# Patient Record
Sex: MALE | Race: WHITE | NOT HISPANIC OR LATINO | Employment: UNEMPLOYED | URBAN - NONMETROPOLITAN AREA
[De-identification: names, ages, dates, MRNs, and addresses within clinical notes are randomized per-mention and may not be internally consistent; named-entity substitution may affect disease eponyms.]

---

## 2020-11-03 ENCOUNTER — OFFICE VISIT (OUTPATIENT)
Dept: PSYCHIATRY | Facility: CLINIC | Age: 30
End: 2020-11-03

## 2020-11-03 VITALS
BODY MASS INDEX: 24.52 KG/M2 | SYSTOLIC BLOOD PRESSURE: 134 MMHG | HEART RATE: 103 BPM | HEIGHT: 64 IN | WEIGHT: 143.6 LBS | TEMPERATURE: 97.1 F | DIASTOLIC BLOOD PRESSURE: 82 MMHG

## 2020-11-03 DIAGNOSIS — F22 DELUSIONAL DISORDER (HCC): ICD-10-CM

## 2020-11-03 DIAGNOSIS — F63.81 INTERMITTENT EXPLOSIVE DISORDER IN ADULT: ICD-10-CM

## 2020-11-03 DIAGNOSIS — F99 INSOMNIA DUE TO OTHER MENTAL DISORDER: ICD-10-CM

## 2020-11-03 DIAGNOSIS — F90.2 ATTENTION DEFICIT HYPERACTIVITY DISORDER (ADHD), COMBINED TYPE: Primary | ICD-10-CM

## 2020-11-03 DIAGNOSIS — F51.05 INSOMNIA DUE TO OTHER MENTAL DISORDER: ICD-10-CM

## 2020-11-03 PROCEDURE — 90792 PSYCH DIAG EVAL W/MED SRVCS: CPT | Performed by: NURSE PRACTITIONER

## 2020-11-03 RX ORDER — LEVOTHYROXINE SODIUM 0.12 MG/1
125 TABLET ORAL DAILY
COMMUNITY
End: 2021-12-23

## 2020-11-03 RX ORDER — DEXTROAMPHETAMINE SACCHARATE, AMPHETAMINE ASPARTATE, DEXTROAMPHETAMINE SULFATE AND AMPHETAMINE SULFATE 3.75; 3.75; 3.75; 3.75 MG/1; MG/1; MG/1; MG/1
15 TABLET ORAL DAILY
Qty: 30 TABLET | Refills: 0 | Status: SHIPPED | OUTPATIENT
Start: 2020-11-03 | End: 2020-12-01

## 2020-11-03 RX ORDER — QUETIAPINE FUMARATE 400 MG/1
400 TABLET, FILM COATED ORAL 2 TIMES DAILY
Qty: 60 TABLET | Refills: 2 | Status: SHIPPED | OUTPATIENT
Start: 2020-11-03 | End: 2020-11-11 | Stop reason: SDUPTHER

## 2020-11-03 RX ORDER — PROPRANOLOL HYDROCHLORIDE 20 MG/1
20 TABLET ORAL 2 TIMES DAILY PRN
Qty: 60 TABLET | Refills: 2 | Status: SHIPPED | OUTPATIENT
Start: 2020-11-03 | End: 2020-11-11 | Stop reason: SDUPTHER

## 2020-11-03 NOTE — PROGRESS NOTES
"Subjective   Arley Sylvester is a 30 y.o. male who is here today for initial appointment to evaluate for medication options.     Chief Complaint:  behaviors    HPI: pt has been treated by someone at [facility].  He is a resident from [facility] which does group housing for individuals.  He has 1 roommate.  Presents with a staff member who is the main historian.  Staff member also place time to patient's mother who is very involved with patient.  Her name is Elizabeth and she is also patient's power of  and she was present for the entire visit.  The face time.  Mom states that patient has been in psychiatric care since he was 8 years old.  That he has been in long-term staff care since age 12.  She states that she is very involved with him and loves him very much that he could not be controlled at home.  He has history of outbursts with physical aggression.  A letter was sent with the patient from a board certified behavior analysis which states that following: Arley sylvester receives behavior supports to address episodes of verbal aggression, physical transportation, property destruction, self-injurious behavior, taking out his belongings, and inappropriate social behavior over the course of the last few months Arley has had several medication changes that.  Directly for stronger than overall increase in the frequency, duration, and magnitude of tardive behavior.  While Arley has been \"healthy at home\" due to the COVID-19, this is not appear to have a negative impact on him, as he enjoys being in his home and having access to all of his belongings.  Each time there has been medication adjustment away from work because he has been prescribed for years, staff describe an increase in target behavior in the weeks and follow-up.  Over the last 2 to 3 weeks, Arley has engaged in high negative behaviors such as tearing down curtains and blinds, flipping his bed, eating/kicking at staff, and spitting on staff.  " "These behaviors are typically very few and far between but have now been occurring at least once a week.  Staff also reported increasing behaviors suggestive, repeating his clothing and displaying verbal aggression towards staff.  Many of the behaviors are seemingly unprovoked (i.e.  Arley was sitting on the couch and all of a sudden reached up and started tearing curtains and blinds down): A result of very minor \"issues\" (staff asking the COVID-19 his tablet away).  Target behaviors occurring in the presence of staff, across all shifts.  Furthermore reports indicate that since the last medication change, Arley has experienced a change in sleeping patterns and is not sleeping well at night.  Historically when this occurs started behaviors are further exacerbated and Arley becomes even more aggressive.  It is a concern of the T that if this pattern of behavior continues to go to potentially hurt himself or others and it is the recommendation of the team that this medication regimen be returned to what he was previously prescribed.  He states he does have trouble both falling and staying asleep.  Currently he is getting approximately 4 hours sleep at night.  Everyone states that he was much better on the Seroquel at 800 mg total along with the Adderall.  States the Adderall seemed to calm him down and decrease his irritability.  Mom and staff member both state that the Strattera has not seemed to help with his aggression.  Mother states that patient's previous medications were Seroquel 400 mg twice a day along with Adderall up to 60 mg a day and states that patient was better on this medication.  They state the Adderall does seem to be down.  See below for other previous medication trials.  They state that patient is living in affectionate at times.  Says that patient is obsessed with \"dark things\" such as cemeteries etc.  They state that patient thinks he can apply.  He talks to himself all the time.  He has " admitted to hearing voices.  Patient currently denies having any visual hallucinations.  He also denied to me that the voices are commanding to him and tell him to hurt himself or others.  He denies any plan to hurt himself.  He has not had a previous suicide attempt in the past.  They also state that at sometimes he will just have a crazy laugh out of nowhere and then starts having destructive behavior.    History of Present Illness    Past Psych History:  Diagnosed with OCD, generalized psychotic disorder, ADHD, moderate mental retardation    Previous Psych Meds: Zyprexa in the past caused a 60 pound weight gain.  Depakote caused neutropenia, Zoloft did not help, clonidine caused some hypotension.    Substance Abuse:  none    Social History: Mom states that he was a full-term baby.  Did have some jaundice but no other complications.  He was developmentally delayed with both talking and sitting up. See above for rest of social        Family Psychiatric History:  family history is not on file.    Medical/Surgical History:  No past medical history on file.  Past Surgical History:   Procedure Laterality Date   • CIRCUMCISION     • TESTICLE UNDESCENDED REPAIR         Allergies   Allergen Reactions   • Dilantin [Phenytoin] Rash           Current Medications:   Current Outpatient Medications   Medication Sig Dispense Refill   • levothyroxine (SYNTHROID, LEVOTHROID) 125 MCG tablet Take 125 mcg by mouth Daily.     • amphetamine-dextroamphetamine (ADDERALL) 15 MG tablet Take 1 tablet by mouth Daily. 30 tablet 0   • propranolol (INDERAL) 20 MG tablet Take 1 tablet by mouth 2 (Two) Times a Day As Needed (anxiety). 60 tablet 2   • QUEtiapine (SEROquel) 400 MG tablet Take 1 tablet by mouth 2 (Two) Times a Day. 60 tablet 2     No current facility-administered medications for this visit.          Review of Systems   Constitutional: Negative for activity change, appetite change and fatigue.   HENT: Negative.    Eyes: Negative for  "visual disturbance.   Respiratory: Negative.    Cardiovascular: Negative.    Gastrointestinal: Negative for nausea.   Endocrine: Negative.    Genitourinary: Negative.    Musculoskeletal: Negative for arthralgias.   Skin: Negative.    Allergic/Immunologic: Negative.    Neurological: Negative for dizziness, seizures and headaches.   Hematological: Negative.    Psychiatric/Behavioral: Positive for agitation, behavioral problems, decreased concentration and sleep disturbance. Negative for confusion, dysphoric mood, hallucinations, self-injury and suicidal ideas. The patient is nervous/anxious and is hyperactive.     denies HEENT, cardiovascular, respiratory, liver, renal, GI/, endocrine, neuro, DERM, hematology, immunology, musculoskeletal disorders.    Objective   Physical Exam  Constitutional:       Appearance: He is normal weight.   HENT:      Head: Normocephalic.   Neck:      Musculoskeletal: Normal range of motion.   Neurological:      Mental Status: He is alert.   Psychiatric:         Attention and Perception: He is inattentive.         Mood and Affect: Affect is blunt.         Behavior: Behavior is slowed. Behavior is cooperative.         Cognition and Memory: Cognition is impaired.         Judgment: Judgment is impulsive.      Comments: Cooperative today.  Poor eye contact.  Speech impediment and hard to understand at times       Blood pressure 134/82, pulse 103, temperature 97.1 °F (36.2 °C), height 162.6 cm (64\"), weight 65.1 kg (143 lb 9.6 oz).    Mental Status Exam:   Hygiene:   fair  Cooperation:  Evasive  Eye Contact:  Poor  Psychomotor Behavior:  Restless  Affect:  Blunted  Hopelessness: Denies  Speech:  hard to understand jarbled  Thought Process:  Unable to demonstrate  Thought Content:  Unable to demonstrate  Suicidal:  None  Homicidal:  None  Hallucinations:  Auditory  Delusion:  None  Memory:  Unable to evaluate  Orientation:  Person  Reliability:  poor  Insight:  Poor  Judgement:  Poor  Impulse " Control:  Poor  Physical/Medical Issues:  Yes seizure disorder, hypothyroidism      Short-term goals: Patient will be compliant with clinic appointments.  Patient will be engaged in therapy, medication compliant with minimal side effects. Patient  will report decrease of symptoms and frequency.    Long-term goals: Patient will have minimal symptoms of  with continued medication management. Patient will be compliant with treatment and appointments.       Problem list:   Strengths:  Weaknesses:     Assessment/Plan   Problems Addressed this Visit     None      Visit Diagnoses     Attention deficit hyperactivity disorder (ADHD), combined type    -  Primary    Relevant Medications    QUEtiapine (SEROquel) 400 MG tablet    amphetamine-dextroamphetamine (ADDERALL) 15 MG tablet    Delusional disorder (CMS/HCC)        Relevant Medications    QUEtiapine (SEROquel) 400 MG tablet    amphetamine-dextroamphetamine (ADDERALL) 15 MG tablet    Intermittent explosive disorder in adult        Relevant Medications    QUEtiapine (SEROquel) 400 MG tablet    propranolol (INDERAL) 20 MG tablet    amphetamine-dextroamphetamine (ADDERALL) 15 MG tablet    Insomnia due to other mental disorder        Relevant Medications    QUEtiapine (SEROquel) 400 MG tablet    amphetamine-dextroamphetamine (ADDERALL) 15 MG tablet      Diagnoses       Codes Comments    Attention deficit hyperactivity disorder (ADHD), combined type    -  Primary ICD-10-CM: F90.2  ICD-9-CM: 314.01     Delusional disorder (CMS/HCC)     ICD-10-CM: F22  ICD-9-CM: 297.1     Intermittent explosive disorder in adult     ICD-10-CM: F63.81  ICD-9-CM: 312.34     Insomnia due to other mental disorder     ICD-10-CM: F51.05, F99  ICD-9-CM: 300.9, 327.02         alissa reviewed.  Shows gabapentin 300mg BID from [facility] Cazares.  Last prescribed 10/27/20.  Also adderall eR last prescribed 8/4/20 by Rohan Araujo.    uds  Obtained today and pending    Very lengthy discussion regarding his  treatment plan.  I am going to go ahead and increase his Seroquel back up to 400 mg twice a day and restart some Adderall but I am only starting it at 15 mg.  I am adding some Inderal twice a day to see if this helps with his overall anxiety as they state when he seems anxious is when he seems to act out more.  Risks, benefits, side effects of the medication were all discussed.  Mom is aware that the Seroquel is in the antipsychotic group and can cause weight gain, increased blood sugar, increased cholesterol as well as permanent movement disorders.  She is also aware that the Adderall is a controlled substance and has abuse potential.  However this medication is at least given in a controlled environment.  She is aware that he will be subjected to urine drug screens as well as Champ reporting.  Aware that this medication can cause increased blood sugar and heart rate.  We will monitor this.  The future possibilities may include retrying a small SSRI.  Intermittent explosive disorder as well as some Remeron for sleep if needed and some hydroxyzine as needed for anxiety we will see.  They state that he does get regular blood work through his primary care physician and they are going to bring in copies of this at his next visit.  I am  also discontinuing the Strattera.    Mother acknowledged and verbally consented to the treatment plan.  .  staff is aware to contact the [facility] with any worsening of symptom.  Staff  is agreeable to go to the ER or call 911 should they begin SI/HI.     Return in 4 weeks.        This document has been electronically signed by TAHIR Galloway on   November 3, 2020 14:11 EST.

## 2020-11-11 ENCOUNTER — TELEPHONE (OUTPATIENT)
Dept: PSYCHIATRY | Facility: CLINIC | Age: 30
End: 2020-11-11

## 2020-11-11 DIAGNOSIS — F63.81 INTERMITTENT EXPLOSIVE DISORDER IN ADULT: ICD-10-CM

## 2020-11-11 RX ORDER — QUETIAPINE FUMARATE 400 MG/1
TABLET, FILM COATED ORAL
Qty: 45 TABLET | Refills: 2 | Status: SHIPPED | OUTPATIENT
Start: 2020-11-11 | End: 2020-12-08 | Stop reason: SDUPTHER

## 2020-11-11 RX ORDER — PROPRANOLOL HYDROCHLORIDE 20 MG/1
20 TABLET ORAL 2 TIMES DAILY
Qty: 60 TABLET | Refills: 2 | Status: SHIPPED | OUTPATIENT
Start: 2020-11-11 | End: 2020-12-08 | Stop reason: SDUPTHER

## 2020-11-11 NOTE — TELEPHONE ENCOUNTER
Mckenna Penaloza, caregiver for patient left voicemail requesting that patients Seroquel be decreased to 200 mg twice daily she states that patient is staying very sleepy on the current dose .    Please advise

## 2020-11-11 NOTE — TELEPHONE ENCOUNTER
I am decreasing his daytime dose to 200mg but he is to stay on the 400mg at the evening.  I will send in prescriptions

## 2020-11-13 ENCOUNTER — TELEPHONE (OUTPATIENT)
Dept: PSYCHIATRY | Facility: CLINIC | Age: 30
End: 2020-11-13

## 2020-11-13 NOTE — TELEPHONE ENCOUNTER
Leeann called and left a voicemail on nurse line regarding patients medication , tried to call back no answer

## 2020-11-16 NOTE — TELEPHONE ENCOUNTER
I was unaware to contact jean carlos , I called and spoke with Pharmacy they states that jean carlos had picked up patient's medications on Friday

## 2020-11-16 NOTE — TELEPHONE ENCOUNTER
TRIED TO CALL PATIENTS MOTHER DERRELL SUMMERS NO ANSWER AND NO VOICEMAIL , ALSO TRIED TO CALL ARJUN BACK NO ANSWER

## 2020-11-30 DIAGNOSIS — F90.2 ATTENTION DEFICIT HYPERACTIVITY DISORDER (ADHD), COMBINED TYPE: ICD-10-CM

## 2020-12-01 RX ORDER — DEXTROAMPHETAMINE SACCHARATE, AMPHETAMINE ASPARTATE, DEXTROAMPHETAMINE SULFATE AND AMPHETAMINE SULFATE 3.75; 3.75; 3.75; 3.75 MG/1; MG/1; MG/1; MG/1
TABLET ORAL
Qty: 30 TABLET | Refills: 0 | Status: SHIPPED | OUTPATIENT
Start: 2020-12-01 | End: 2020-12-29 | Stop reason: SDUPTHER

## 2020-12-08 ENCOUNTER — OFFICE VISIT (OUTPATIENT)
Dept: PSYCHIATRY | Facility: CLINIC | Age: 30
End: 2020-12-08

## 2020-12-08 DIAGNOSIS — F63.81 INTERMITTENT EXPLOSIVE DISORDER IN ADULT: ICD-10-CM

## 2020-12-08 PROCEDURE — G2025 DIS SITE TELE SVCS RHC/FQHC: HCPCS | Performed by: NURSE PRACTITIONER

## 2020-12-08 RX ORDER — QUETIAPINE FUMARATE 400 MG/1
TABLET, FILM COATED ORAL
Qty: 60 TABLET | Refills: 2 | Status: SHIPPED | OUTPATIENT
Start: 2020-12-08 | End: 2021-01-12 | Stop reason: SDUPTHER

## 2020-12-08 RX ORDER — PROPRANOLOL HYDROCHLORIDE 20 MG/1
20 TABLET ORAL 2 TIMES DAILY
Qty: 60 TABLET | Refills: 2 | Status: SHIPPED | OUTPATIENT
Start: 2020-12-08 | End: 2021-01-12 | Stop reason: SDUPTHER

## 2020-12-08 NOTE — PROGRESS NOTES
This is a telephone visit due to the covid pandemic.  Staff member is the histortyler.      Cc:  Recheck on behaviors.  Last blood work was in August per PCP    Historian says pt is doing pretty good.  He says that she has talked with his mother and the 400 mg of Seroquel at once during the day seemed to be too much as it made him drowsy but the 200 mg by itself is really not strong enough.  She states that patient will start to get more anxious, pacing etc. and they feel he needs to 400 mg but want to try it broken up to 200 mg doses during the day.  States the Adderall has helped and I do not know if it wears off in the afternoon as to why he is becoming more restless for his that he needs more of the Seroquel.  She states in the past patient has slept better in general with 800 mg of Seroquel time during the day.  The Inderal has seemed to help with patient's anxiety level.  She also states that his blood pressure is more leveled out on it and he is tolerating the medication well.  Denies any tremors or any negative side effects to the medication.  His sleep is inconsistent right now but for the most part he gets several hours a night.    Plan I am going to send in the Seroquel and have him do 200 mg in the morning 200 mg in the afternoon of 100 of the evening.  He is to continue the other medications at present dosing.  I will have him return to clinic as scheduled in January and we did discuss that he will be n.p.o. that morning and will draw labs for metabolic syndrome.    .This visit has been rescheduled as a phone visit to comply with patient safety concerns in accordance with CDC recommendations. Total time of discussion was 11 minutes.

## 2020-12-29 DIAGNOSIS — F90.2 ATTENTION DEFICIT HYPERACTIVITY DISORDER (ADHD), COMBINED TYPE: ICD-10-CM

## 2020-12-29 RX ORDER — DEXTROAMPHETAMINE SACCHARATE, AMPHETAMINE ASPARTATE, DEXTROAMPHETAMINE SULFATE AND AMPHETAMINE SULFATE 3.75; 3.75; 3.75; 3.75 MG/1; MG/1; MG/1; MG/1
1 TABLET ORAL DAILY
Qty: 30 TABLET | Refills: 0 | Status: SHIPPED | OUTPATIENT
Start: 2020-12-29 | End: 2021-01-12

## 2021-01-12 ENCOUNTER — OFFICE VISIT (OUTPATIENT)
Dept: PSYCHIATRY | Facility: CLINIC | Age: 31
End: 2021-01-12

## 2021-01-12 ENCOUNTER — LAB (OUTPATIENT)
Dept: FAMILY MEDICINE CLINIC | Facility: CLINIC | Age: 31
End: 2021-01-12

## 2021-01-12 VITALS
WEIGHT: 146.8 LBS | SYSTOLIC BLOOD PRESSURE: 122 MMHG | HEART RATE: 97 BPM | TEMPERATURE: 96.9 F | HEIGHT: 64 IN | DIASTOLIC BLOOD PRESSURE: 78 MMHG | BODY MASS INDEX: 25.06 KG/M2

## 2021-01-12 DIAGNOSIS — F99 INSOMNIA DUE TO OTHER MENTAL DISORDER: ICD-10-CM

## 2021-01-12 DIAGNOSIS — F90.2 ATTENTION DEFICIT HYPERACTIVITY DISORDER (ADHD), COMBINED TYPE: ICD-10-CM

## 2021-01-12 DIAGNOSIS — Z79.899 LONG-TERM USE OF HIGH-RISK MEDICATION: ICD-10-CM

## 2021-01-12 DIAGNOSIS — F22 DELUSIONAL DISORDER (HCC): ICD-10-CM

## 2021-01-12 DIAGNOSIS — F51.05 INSOMNIA DUE TO OTHER MENTAL DISORDER: ICD-10-CM

## 2021-01-12 DIAGNOSIS — F63.81 INTERMITTENT EXPLOSIVE DISORDER IN ADULT: Primary | ICD-10-CM

## 2021-01-12 PROCEDURE — 36415 COLL VENOUS BLD VENIPUNCTURE: CPT | Performed by: NURSE PRACTITIONER

## 2021-01-12 PROCEDURE — 85025 COMPLETE CBC W/AUTO DIFF WBC: CPT | Performed by: NURSE PRACTITIONER

## 2021-01-12 PROCEDURE — 80061 LIPID PANEL: CPT | Performed by: NURSE PRACTITIONER

## 2021-01-12 PROCEDURE — 80053 COMPREHEN METABOLIC PANEL: CPT | Performed by: NURSE PRACTITIONER

## 2021-01-12 PROCEDURE — 83036 HEMOGLOBIN GLYCOSYLATED A1C: CPT | Performed by: NURSE PRACTITIONER

## 2021-01-12 PROCEDURE — 99214 OFFICE O/P EST MOD 30 MIN: CPT | Performed by: NURSE PRACTITIONER

## 2021-01-12 RX ORDER — QUETIAPINE FUMARATE 400 MG/1
TABLET, FILM COATED ORAL
Qty: 60 TABLET | Refills: 2 | Status: SHIPPED | OUTPATIENT
Start: 2021-01-12 | End: 2021-04-06 | Stop reason: SDUPTHER

## 2021-01-12 RX ORDER — PROPRANOLOL HYDROCHLORIDE 20 MG/1
20 TABLET ORAL 2 TIMES DAILY
Qty: 60 TABLET | Refills: 2 | Status: SHIPPED | OUTPATIENT
Start: 2021-01-12 | End: 2021-04-06 | Stop reason: SDUPTHER

## 2021-01-12 RX ORDER — DEXTROAMPHETAMINE SACCHARATE, AMPHETAMINE ASPARTATE MONOHYDRATE, DEXTROAMPHETAMINE SULFATE AND AMPHETAMINE SULFATE 3.75; 3.75; 3.75; 3.75 MG/1; MG/1; MG/1; MG/1
15 CAPSULE, EXTENDED RELEASE ORAL EVERY MORNING
Qty: 30 CAPSULE | Refills: 0 | Status: SHIPPED | OUTPATIENT
Start: 2021-01-12 | End: 2021-02-26 | Stop reason: SDUPTHER

## 2021-01-13 LAB
ALBUMIN SERPL-MCNC: 5 G/DL (ref 3.5–5.2)
ALBUMIN/GLOB SERPL: 2.2 G/DL
ALP SERPL-CCNC: 62 U/L (ref 39–117)
ALT SERPL W P-5'-P-CCNC: 18 U/L (ref 1–41)
ANION GAP SERPL CALCULATED.3IONS-SCNC: 6.7 MMOL/L (ref 5–15)
AST SERPL-CCNC: 34 U/L (ref 1–40)
BASOPHILS # BLD AUTO: 0.01 10*3/MM3 (ref 0–0.2)
BASOPHILS NFR BLD AUTO: 0.2 % (ref 0–1.5)
BILIRUB SERPL-MCNC: 0.2 MG/DL (ref 0–1.2)
BUN SERPL-MCNC: 16 MG/DL (ref 6–20)
BUN/CREAT SERPL: 21.3 (ref 7–25)
CALCIUM SPEC-SCNC: 9.2 MG/DL (ref 8.6–10.5)
CHLORIDE SERPL-SCNC: 102 MMOL/L (ref 98–107)
CHOLEST SERPL-MCNC: 153 MG/DL (ref 0–200)
CO2 SERPL-SCNC: 30.3 MMOL/L (ref 22–29)
CREAT SERPL-MCNC: 0.75 MG/DL (ref 0.76–1.27)
DEPRECATED RDW RBC AUTO: 40.6 FL (ref 37–54)
EOSINOPHIL # BLD AUTO: 0.22 10*3/MM3 (ref 0–0.4)
EOSINOPHIL NFR BLD AUTO: 5.5 % (ref 0.3–6.2)
ERYTHROCYTE [DISTWIDTH] IN BLOOD BY AUTOMATED COUNT: 14 % (ref 12.3–15.4)
GFR SERPL CREATININE-BSD FRML MDRD: 122 ML/MIN/1.73
GLOBULIN UR ELPH-MCNC: 2.3 GM/DL
GLUCOSE SERPL-MCNC: 91 MG/DL (ref 65–99)
HBA1C MFR BLD: 5.4 % (ref 4.8–5.6)
HCT VFR BLD AUTO: 41.1 % (ref 37.5–51)
HDLC SERPL-MCNC: 46 MG/DL (ref 40–60)
HGB BLD-MCNC: 13.5 G/DL (ref 13–17.7)
IMM GRANULOCYTES # BLD AUTO: 0.02 10*3/MM3 (ref 0–0.05)
IMM GRANULOCYTES NFR BLD AUTO: 0.5 % (ref 0–0.5)
LDLC SERPL CALC-MCNC: 93 MG/DL (ref 0–100)
LDLC/HDLC SERPL: 2.01 {RATIO}
LYMPHOCYTES # BLD AUTO: 0.9 10*3/MM3 (ref 0.7–3.1)
LYMPHOCYTES NFR BLD AUTO: 22.3 % (ref 19.6–45.3)
MCH RBC QN AUTO: 26.7 PG (ref 26.6–33)
MCHC RBC AUTO-ENTMCNC: 32.8 G/DL (ref 31.5–35.7)
MCV RBC AUTO: 81.4 FL (ref 79–97)
MONOCYTES # BLD AUTO: 0.48 10*3/MM3 (ref 0.1–0.9)
MONOCYTES NFR BLD AUTO: 11.9 % (ref 5–12)
NEUTROPHILS NFR BLD AUTO: 2.4 10*3/MM3 (ref 1.7–7)
NEUTROPHILS NFR BLD AUTO: 59.6 % (ref 42.7–76)
NRBC BLD AUTO-RTO: 0 /100 WBC (ref 0–0.2)
PLATELET # BLD AUTO: 196 10*3/MM3 (ref 140–450)
PMV BLD AUTO: 11.4 FL (ref 6–12)
POTASSIUM SERPL-SCNC: 4.6 MMOL/L (ref 3.5–5.2)
PROT SERPL-MCNC: 7.3 G/DL (ref 6–8.5)
RBC # BLD AUTO: 5.05 10*6/MM3 (ref 4.14–5.8)
SODIUM SERPL-SCNC: 139 MMOL/L (ref 136–145)
TRIGL SERPL-MCNC: 72 MG/DL (ref 0–150)
VLDLC SERPL-MCNC: 14 MG/DL (ref 5–40)
WBC # BLD AUTO: 4.03 10*3/MM3 (ref 3.4–10.8)

## 2021-02-26 DIAGNOSIS — F90.2 ATTENTION DEFICIT HYPERACTIVITY DISORDER (ADHD), COMBINED TYPE: ICD-10-CM

## 2021-02-26 RX ORDER — DEXTROAMPHETAMINE SACCHARATE, AMPHETAMINE ASPARTATE MONOHYDRATE, DEXTROAMPHETAMINE SULFATE AND AMPHETAMINE SULFATE 3.75; 3.75; 3.75; 3.75 MG/1; MG/1; MG/1; MG/1
15 CAPSULE, EXTENDED RELEASE ORAL EVERY MORNING
Qty: 30 CAPSULE | Refills: 0 | Status: SHIPPED | OUTPATIENT
Start: 2021-02-26 | End: 2021-03-31 | Stop reason: SDUPTHER

## 2021-03-31 DIAGNOSIS — F90.2 ATTENTION DEFICIT HYPERACTIVITY DISORDER (ADHD), COMBINED TYPE: ICD-10-CM

## 2021-03-31 RX ORDER — DEXTROAMPHETAMINE SACCHARATE, AMPHETAMINE ASPARTATE MONOHYDRATE, DEXTROAMPHETAMINE SULFATE AND AMPHETAMINE SULFATE 3.75; 3.75; 3.75; 3.75 MG/1; MG/1; MG/1; MG/1
15 CAPSULE, EXTENDED RELEASE ORAL EVERY MORNING
Qty: 30 CAPSULE | Refills: 0 | Status: SHIPPED | OUTPATIENT
Start: 2021-03-31 | End: 2021-04-06

## 2021-04-06 ENCOUNTER — OFFICE VISIT (OUTPATIENT)
Dept: PSYCHIATRY | Facility: CLINIC | Age: 31
End: 2021-04-06

## 2021-04-06 VITALS
WEIGHT: 155.6 LBS | TEMPERATURE: 97.3 F | BODY MASS INDEX: 26.56 KG/M2 | HEIGHT: 64 IN | HEART RATE: 91 BPM | DIASTOLIC BLOOD PRESSURE: 79 MMHG | SYSTOLIC BLOOD PRESSURE: 127 MMHG

## 2021-04-06 DIAGNOSIS — F79 INTELLECTUAL DISABILITY: ICD-10-CM

## 2021-04-06 DIAGNOSIS — F90.2 ATTENTION DEFICIT HYPERACTIVITY DISORDER (ADHD), COMBINED TYPE: Primary | ICD-10-CM

## 2021-04-06 DIAGNOSIS — Z79.899 ENCOUNTER FOR LONG-TERM (CURRENT) USE OF OTHER MEDICATIONS: ICD-10-CM

## 2021-04-06 DIAGNOSIS — F63.81 INTERMITTENT EXPLOSIVE DISORDER IN ADULT: ICD-10-CM

## 2021-04-06 PROCEDURE — 99214 OFFICE O/P EST MOD 30 MIN: CPT | Performed by: NURSE PRACTITIONER

## 2021-04-06 RX ORDER — DEXTROAMPHETAMINE SACCHARATE, AMPHETAMINE ASPARTATE, DEXTROAMPHETAMINE SULFATE AND AMPHETAMINE SULFATE 3.75; 3.75; 3.75; 3.75 MG/1; MG/1; MG/1; MG/1
15 TABLET ORAL 2 TIMES DAILY
Qty: 60 TABLET | Refills: 0 | Status: SHIPPED | OUTPATIENT
Start: 2021-04-06 | End: 2021-05-05 | Stop reason: SDUPTHER

## 2021-04-06 RX ORDER — HYDROXYZINE PAMOATE 50 MG/1
50 CAPSULE ORAL 2 TIMES DAILY
Qty: 60 CAPSULE | Refills: 1 | Status: SHIPPED | OUTPATIENT
Start: 2021-04-06 | End: 2021-04-09

## 2021-04-06 RX ORDER — ESCITALOPRAM OXALATE 5 MG/1
5 TABLET ORAL DAILY
Qty: 30 TABLET | Refills: 1 | Status: SHIPPED | OUTPATIENT
Start: 2021-04-06 | End: 2021-04-09

## 2021-04-06 RX ORDER — QUETIAPINE FUMARATE 400 MG/1
TABLET, FILM COATED ORAL
Qty: 60 TABLET | Refills: 2 | Status: SHIPPED | OUTPATIENT
Start: 2021-04-06 | End: 2021-05-10

## 2021-04-06 RX ORDER — GABAPENTIN 300 MG/1
300 CAPSULE ORAL 3 TIMES DAILY
COMMUNITY
End: 2022-09-21

## 2021-04-06 RX ORDER — PROPRANOLOL HYDROCHLORIDE 20 MG/1
20 TABLET ORAL 3 TIMES DAILY
Qty: 90 TABLET | Refills: 1 | Status: SHIPPED | OUTPATIENT
Start: 2021-04-06 | End: 2021-05-07
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
## 2021-04-08 DIAGNOSIS — F63.81 INTERMITTENT EXPLOSIVE DISORDER IN ADULT: ICD-10-CM

## 2021-04-09 RX ORDER — HYDROXYZINE PAMOATE 50 MG/1
CAPSULE ORAL
Qty: 62 CAPSULE | Refills: 1 | Status: SHIPPED | OUTPATIENT
Start: 2021-04-09 | End: 2021-05-19 | Stop reason: SDUPTHER

## 2021-04-09 RX ORDER — ESCITALOPRAM OXALATE 5 MG/1
TABLET ORAL
Qty: 31 TABLET | Refills: 1 | Status: SHIPPED | OUTPATIENT
Start: 2021-04-09 | End: 2021-04-20

## 2021-04-20 ENCOUNTER — OFFICE VISIT (OUTPATIENT)
Dept: PSYCHIATRY | Facility: CLINIC | Age: 31
End: 2021-04-20

## 2021-04-20 DIAGNOSIS — F79 INTELLECTUAL DISABILITY: ICD-10-CM

## 2021-04-20 DIAGNOSIS — F90.2 ATTENTION DEFICIT HYPERACTIVITY DISORDER (ADHD), COMBINED TYPE: ICD-10-CM

## 2021-04-20 DIAGNOSIS — F63.81 INTERMITTENT EXPLOSIVE DISORDER IN ADULT: Primary | ICD-10-CM

## 2021-04-20 PROCEDURE — G2025 DIS SITE TELE SVCS RHC/FQHC: HCPCS | Performed by: NURSE PRACTITIONER

## 2021-04-20 NOTE — PROGRESS NOTES
You have chosen to receive care through a telephone visit. Do you consent to use a telephone visit for your medical care today? Yes     Cc recheck on behavior.      Mckenna Penaloza is the historian.  Pt has went to the ER with rash over the weekend.  Believe lexapro is the culprit.  He had a rash all over him.  They have stopped the lexapro.  Rash has improved.  She days pts behaviors are much better.  Sleeping better, less outbursts.      Treatment plan:  Stop the lexapro.  Continue the adderall, seroquel, vistaril, inderal.  I will talk with my psychiatrist co workers about trying another SSRI in the future.  For now leave off the SSRI.  They are to notify me should any other problems arise.      This visit has been rescheduled as a phone visit to comply with patient safety concerns in accordance with CDC recommendations. Total time of discussion was 11  minutes.

## 2021-04-28 ENCOUNTER — TELEPHONE (OUTPATIENT)
Dept: FAMILY MEDICINE CLINIC | Facility: CLINIC | Age: 31
End: 2021-04-28

## 2021-05-05 DIAGNOSIS — F90.2 ATTENTION DEFICIT HYPERACTIVITY DISORDER (ADHD), COMBINED TYPE: ICD-10-CM

## 2021-05-05 DIAGNOSIS — F63.81 INTERMITTENT EXPLOSIVE DISORDER IN ADULT: ICD-10-CM

## 2021-05-05 RX ORDER — DEXTROAMPHETAMINE SACCHARATE, AMPHETAMINE ASPARTATE, DEXTROAMPHETAMINE SULFATE AND AMPHETAMINE SULFATE 3.75; 3.75; 3.75; 3.75 MG/1; MG/1; MG/1; MG/1
15 TABLET ORAL 2 TIMES DAILY
Qty: 60 TABLET | Refills: 0 | Status: SHIPPED | OUTPATIENT
Start: 2021-05-05 | End: 2021-06-07 | Stop reason: SDUPTHER

## 2021-05-07 DIAGNOSIS — F63.81 INTERMITTENT EXPLOSIVE DISORDER IN ADULT: ICD-10-CM

## 2021-05-07 RX ORDER — PROPRANOLOL HYDROCHLORIDE 20 MG/1
TABLET ORAL
Qty: 90 TABLET | Refills: 1 | Status: SHIPPED | OUTPATIENT
Start: 2021-05-07 | End: 2021-05-19 | Stop reason: SDUPTHER

## 2021-05-10 DIAGNOSIS — F63.81 INTERMITTENT EXPLOSIVE DISORDER IN ADULT: ICD-10-CM

## 2021-05-10 RX ORDER — QUETIAPINE FUMARATE 200 MG/1
TABLET, FILM COATED ORAL
Qty: 60 TABLET | Refills: 2 | Status: SHIPPED | OUTPATIENT
Start: 2021-05-10 | End: 2021-05-19 | Stop reason: SDUPTHER

## 2021-05-10 RX ORDER — QUETIAPINE FUMARATE 400 MG/1
TABLET, FILM COATED ORAL
Qty: 30 TABLET | Refills: 2 | Status: SHIPPED | OUTPATIENT
Start: 2021-05-10 | End: 2021-05-19 | Stop reason: SDUPTHER

## 2021-05-19 ENCOUNTER — OFFICE VISIT (OUTPATIENT)
Dept: PSYCHIATRY | Facility: CLINIC | Age: 31
End: 2021-05-19

## 2021-05-19 VITALS
BODY MASS INDEX: 25.57 KG/M2 | HEART RATE: 85 BPM | SYSTOLIC BLOOD PRESSURE: 146 MMHG | WEIGHT: 149.8 LBS | HEIGHT: 64 IN | TEMPERATURE: 97.1 F | DIASTOLIC BLOOD PRESSURE: 89 MMHG

## 2021-05-19 DIAGNOSIS — F90.2 ATTENTION DEFICIT HYPERACTIVITY DISORDER (ADHD), COMBINED TYPE: ICD-10-CM

## 2021-05-19 DIAGNOSIS — F22 DELUSIONAL DISORDER (HCC): ICD-10-CM

## 2021-05-19 DIAGNOSIS — F63.81 INTERMITTENT EXPLOSIVE DISORDER IN ADULT: Primary | ICD-10-CM

## 2021-05-19 DIAGNOSIS — F79 INTELLECTUAL DISABILITY: ICD-10-CM

## 2021-05-19 PROCEDURE — 99214 OFFICE O/P EST MOD 30 MIN: CPT | Performed by: NURSE PRACTITIONER

## 2021-05-19 RX ORDER — QUETIAPINE FUMARATE 400 MG/1
TABLET, FILM COATED ORAL
Qty: 30 TABLET | Refills: 3 | Status: SHIPPED | OUTPATIENT
Start: 2021-05-19 | End: 2021-07-27 | Stop reason: SDUPTHER

## 2021-05-19 RX ORDER — QUETIAPINE FUMARATE 200 MG/1
TABLET, FILM COATED ORAL
Qty: 60 TABLET | Refills: 3 | Status: SHIPPED | OUTPATIENT
Start: 2021-05-19 | End: 2021-07-27 | Stop reason: SDUPTHER

## 2021-05-19 RX ORDER — PROPRANOLOL HYDROCHLORIDE 20 MG/1
20 TABLET ORAL 3 TIMES DAILY
Qty: 90 TABLET | Refills: 3 | Status: SHIPPED | OUTPATIENT
Start: 2021-05-19 | End: 2021-07-27 | Stop reason: SDUPTHER

## 2021-05-19 RX ORDER — HYDROXYZINE PAMOATE 50 MG/1
50 CAPSULE ORAL 2 TIMES DAILY
Qty: 60 CAPSULE | Refills: 3 | Status: SHIPPED | OUTPATIENT
Start: 2021-05-19 | End: 2021-07-27 | Stop reason: SDUPTHER

## 2021-05-19 RX ORDER — BENZOYL PEROXIDE 50 MG/ML
148 LIQUID TOPICAL 2 TIMES DAILY
COMMUNITY
Start: 2021-05-06

## 2021-05-19 NOTE — PROGRESS NOTES
Kymberly Torres is a 31 y.o. male is here today for medication management follow-up. Student Ovi Corral present with patient's permission.     Chief Complaint:  Recheck on behaviors and hallucinations    History of Present Illness: Patient presents with  staff member Sandy  and gives permission to speak in front of.  Sandy is main historian as pt is poor historian.  She states that patient has been doing well.  Has not had any outbursts or problems with staff.  Sleeping well at night without difficulty.  No negative side effects to medication.  No signs of depression.  Medical stressors.Body mass index is 25.71 kg/m². no appetite changes.  Pt is pleasant and cooperative during visit.  Poor historian as to symptoms.          The following portions of the patient's history were reviewed and updated as appropriate: allergies, current medications, past family history, past medical history, past social history, past surgical history and problem list.    Review of Systems   Constitutional: Negative for activity change, appetite change and fatigue.   HENT: Negative.    Eyes: Negative for visual disturbance.   Respiratory: Negative.    Cardiovascular: Negative.    Gastrointestinal: Negative for nausea.   Endocrine: Negative.    Genitourinary: Negative.    Musculoskeletal: Negative for arthralgias.   Skin: Negative.    Allergic/Immunologic: Negative.    Neurological: Negative for dizziness, seizures and headaches.   Hematological: Negative.    Psychiatric/Behavioral: Positive for agitation and behavioral problems. Negative for confusion, decreased concentration, dysphoric mood, hallucinations, self-injury, sleep disturbance and suicidal ideas. The patient is not nervous/anxious and is not hyperactive.        Objective   Physical Exam  Vitals reviewed.   Constitutional:       Appearance: He is normal weight.   Musculoskeletal:      Cervical back: Normal range of motion.   Neurological:      Mental Status: He  "is alert.   Psychiatric:         Attention and Perception: He is inattentive.         Mood and Affect: Mood is anxious.         Behavior: Behavior is cooperative.         Cognition and Memory: Cognition is impaired.         Judgment: Judgment is impulsive.      Comments: Cooperative.  Had to be redirected by staff member several times.  Speech is hard to understand.  Was fidgety.         Blood pressure 146/89, pulse 85, temperature 97.1 °F (36.2 °C), height 162.6 cm (64\"), weight 67.9 kg (149 lb 12.8 oz).    Medication List:   Current Outpatient Medications   Medication Sig Dispense Refill   • amphetamine-dextroamphetamine (Adderall) 15 MG tablet Take 1 tablet by mouth 2 (Two) Times a Day. One at 7AM and one at noon 60 tablet 0   • gabapentin (NEURONTIN) 300 MG capsule Take 300 mg by mouth 3 (Three) Times a Day.     • hydrOXYzine pamoate (VISTARIL) 50 MG capsule Take 1 capsule by mouth 2 (two) times a day. 60 capsule 3   • levothyroxine (SYNTHROID, LEVOTHROID) 125 MCG tablet Take 125 mcg by mouth Daily.     • propranolol (INDERAL) 20 MG tablet Take 1 tablet by mouth 3 (Three) Times a Day. 90 tablet 3   • QUEtiapine (SEROquel) 200 MG tablet Take 1 table q aM and at 2 PM 60 tablet 3   • QUEtiapine (SEROquel) 400 MG tablet TAKE 1 TABLET BY MOUTH AT BEDTIME 30 tablet 3   • benzoyl peroxide 5 % external wash Apply 148 doses topically to the appropriate area as directed 2 (two) times a day.       No current facility-administered medications for this visit.       Mental Status Exam:   Hygiene:   fair  Cooperation:  Cooperative  Eye Contact:  Poor  Psychomotor Behavior:  Restless  Affect:  Blunted  Hopelessness: Denies  Speech:  difficult to understand  Thought Process:  Unable to demonstrate  Thought Content:  Unable to demonstrate  Suicidal:  None  Homicidal:  None  Hallucinations:  Not demonstrated today  Delusion:  Unable to demonstrate  Memory:  Unable to evaluate  Orientation:  Unable to evaluate  Reliability:  " poor  Insight:  Poor  Judgement:  Poor  Impulse Control:  Poor  Physical/Medical Issues:  Yes hypothyroidism    Assessment/Plan   Problems Addressed this Visit     None      Visit Diagnoses     Intermittent explosive disorder in adult    -  Primary    Relevant Medications    hydrOXYzine pamoate (VISTARIL) 50 MG capsule    propranolol (INDERAL) 20 MG tablet    QUEtiapine (SEROquel) 400 MG tablet    QUEtiapine (SEROquel) 200 MG tablet    Attention deficit hyperactivity disorder (ADHD), combined type        Relevant Medications    hydrOXYzine pamoate (VISTARIL) 50 MG capsule    QUEtiapine (SEROquel) 400 MG tablet    QUEtiapine (SEROquel) 200 MG tablet    Intellectual disability        Relevant Medications    hydrOXYzine pamoate (VISTARIL) 50 MG capsule    QUEtiapine (SEROquel) 400 MG tablet    QUEtiapine (SEROquel) 200 MG tablet    Delusional disorder (CMS/HCC)        Relevant Medications    hydrOXYzine pamoate (VISTARIL) 50 MG capsule    QUEtiapine (SEROquel) 400 MG tablet    QUEtiapine (SEROquel) 200 MG tablet      Diagnoses       Codes Comments    Intermittent explosive disorder in adult    -  Primary ICD-10-CM: F63.81  ICD-9-CM: 312.34     Attention deficit hyperactivity disorder (ADHD), combined type     ICD-10-CM: F90.2  ICD-9-CM: 314.01     Intellectual disability     ICD-10-CM: F79  ICD-9-CM: 319     Delusional disorder (CMS/HCC)     ICD-10-CM: F22  ICD-9-CM: 297.1           Functionality: pt having significant impairment in important areas of daily functioning. Lives in residential housing.    Prognosis: Guarded dependent on medication/follow up and treatment plan compliance.  uds obtained today and pending.  alissa reviewed.      Last UDs did not show his adderall.  I am rechecking UDs today.  He is to continue the adderall for the adhd, vistaril for the anxiety, inderal for the IED and the seroquel for the IED.  Refills have been submitted.    Staff to notify me should any problems develop.  He will RTC 3-4  months.                    This document has been electronically signed by TAHIR Galloway on   May 19, 2021 12:16 EDT.

## 2021-06-04 DIAGNOSIS — F63.81 INTERMITTENT EXPLOSIVE DISORDER IN ADULT: ICD-10-CM

## 2021-06-04 DIAGNOSIS — F90.2 ATTENTION DEFICIT HYPERACTIVITY DISORDER (ADHD), COMBINED TYPE: ICD-10-CM

## 2021-06-04 RX ORDER — DEXTROAMPHETAMINE SACCHARATE, AMPHETAMINE ASPARTATE, DEXTROAMPHETAMINE SULFATE AND AMPHETAMINE SULFATE 3.75; 3.75; 3.75; 3.75 MG/1; MG/1; MG/1; MG/1
15 TABLET ORAL 2 TIMES DAILY
Qty: 60 TABLET | Refills: 0 | Status: CANCELLED | OUTPATIENT
Start: 2021-06-04 | End: 2022-06-04

## 2021-06-07 DIAGNOSIS — F90.2 ATTENTION DEFICIT HYPERACTIVITY DISORDER (ADHD), COMBINED TYPE: ICD-10-CM

## 2021-06-07 DIAGNOSIS — F63.81 INTERMITTENT EXPLOSIVE DISORDER IN ADULT: ICD-10-CM

## 2021-06-07 RX ORDER — DEXTROAMPHETAMINE SACCHARATE, AMPHETAMINE ASPARTATE, DEXTROAMPHETAMINE SULFATE AND AMPHETAMINE SULFATE 3.75; 3.75; 3.75; 3.75 MG/1; MG/1; MG/1; MG/1
15 TABLET ORAL 2 TIMES DAILY
Qty: 60 TABLET | Refills: 0 | Status: SHIPPED | OUTPATIENT
Start: 2021-06-07 | End: 2021-07-06 | Stop reason: SDUPTHER

## 2021-06-24 ENCOUNTER — OFFICE VISIT (OUTPATIENT)
Dept: PSYCHIATRY | Facility: CLINIC | Age: 31
End: 2021-06-24

## 2021-06-24 VITALS
WEIGHT: 142.8 LBS | BODY MASS INDEX: 24.38 KG/M2 | SYSTOLIC BLOOD PRESSURE: 120 MMHG | DIASTOLIC BLOOD PRESSURE: 86 MMHG | HEIGHT: 64 IN

## 2021-06-24 DIAGNOSIS — F22 DELUSIONAL DISORDER (HCC): ICD-10-CM

## 2021-06-24 DIAGNOSIS — F90.2 ATTENTION DEFICIT HYPERACTIVITY DISORDER (ADHD), COMBINED TYPE: Primary | ICD-10-CM

## 2021-06-24 DIAGNOSIS — F63.81 INTERMITTENT EXPLOSIVE DISORDER IN ADULT: ICD-10-CM

## 2021-06-24 DIAGNOSIS — F79 INTELLECTUAL DISABILITY: ICD-10-CM

## 2021-06-24 PROCEDURE — 99214 OFFICE O/P EST MOD 30 MIN: CPT | Performed by: NURSE PRACTITIONER
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
## 2021-07-06 DIAGNOSIS — F90.2 ATTENTION DEFICIT HYPERACTIVITY DISORDER (ADHD), COMBINED TYPE: ICD-10-CM

## 2021-07-06 DIAGNOSIS — F63.81 INTERMITTENT EXPLOSIVE DISORDER IN ADULT: ICD-10-CM

## 2021-07-06 RX ORDER — DEXTROAMPHETAMINE SACCHARATE, AMPHETAMINE ASPARTATE, DEXTROAMPHETAMINE SULFATE AND AMPHETAMINE SULFATE 3.75; 3.75; 3.75; 3.75 MG/1; MG/1; MG/1; MG/1
15 TABLET ORAL 2 TIMES DAILY
Qty: 60 TABLET | Refills: 0 | Status: SHIPPED | OUTPATIENT
Start: 2021-07-06 | End: 2021-08-05 | Stop reason: SDUPTHER

## 2021-07-27 ENCOUNTER — TELEMEDICINE (OUTPATIENT)
Dept: PSYCHIATRY | Facility: CLINIC | Age: 31
End: 2021-07-27

## 2021-07-27 DIAGNOSIS — F63.81 INTERMITTENT EXPLOSIVE DISORDER IN ADULT: ICD-10-CM

## 2021-07-27 DIAGNOSIS — F79 INTELLECTUAL DISABILITY: ICD-10-CM

## 2021-07-27 DIAGNOSIS — F90.2 ATTENTION DEFICIT HYPERACTIVITY DISORDER (ADHD), COMBINED TYPE: Primary | ICD-10-CM

## 2021-07-27 PROCEDURE — 99214 OFFICE O/P EST MOD 30 MIN: CPT | Performed by: NURSE PRACTITIONER

## 2021-07-27 RX ORDER — QUETIAPINE FUMARATE 200 MG/1
TABLET, FILM COATED ORAL
Qty: 60 TABLET | Refills: 3 | Status: SHIPPED | OUTPATIENT
Start: 2021-07-27 | End: 2021-08-26 | Stop reason: SDUPTHER

## 2021-07-27 RX ORDER — HYDROXYZINE PAMOATE 50 MG/1
50 CAPSULE ORAL 2 TIMES DAILY
Qty: 60 CAPSULE | Refills: 3 | Status: SHIPPED | OUTPATIENT
Start: 2021-07-27 | End: 2021-08-26 | Stop reason: SDUPTHER

## 2021-07-27 RX ORDER — PROPRANOLOL HYDROCHLORIDE 20 MG/1
20 TABLET ORAL 3 TIMES DAILY
Qty: 90 TABLET | Refills: 3 | Status: SHIPPED | OUTPATIENT
Start: 2021-07-27 | End: 2021-08-26 | Stop reason: SDUPTHER

## 2021-07-27 RX ORDER — QUETIAPINE FUMARATE 400 MG/1
TABLET, FILM COATED ORAL
Qty: 30 TABLET | Refills: 3 | Status: SHIPPED | OUTPATIENT
Start: 2021-07-27 | End: 2021-08-26 | Stop reason: SDUPTHER
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
## 2021-08-05 DIAGNOSIS — F90.2 ATTENTION DEFICIT HYPERACTIVITY DISORDER (ADHD), COMBINED TYPE: ICD-10-CM

## 2021-08-05 DIAGNOSIS — F63.81 INTERMITTENT EXPLOSIVE DISORDER IN ADULT: ICD-10-CM

## 2021-08-05 RX ORDER — DEXTROAMPHETAMINE SACCHARATE, AMPHETAMINE ASPARTATE, DEXTROAMPHETAMINE SULFATE AND AMPHETAMINE SULFATE 3.75; 3.75; 3.75; 3.75 MG/1; MG/1; MG/1; MG/1
15 TABLET ORAL 2 TIMES DAILY
Qty: 60 TABLET | Refills: 0 | Status: SHIPPED | OUTPATIENT
Start: 2021-08-05 | End: 2021-09-01

## 2021-08-05 RX ORDER — DEXTROAMPHETAMINE SACCHARATE, AMPHETAMINE ASPARTATE, DEXTROAMPHETAMINE SULFATE AND AMPHETAMINE SULFATE 3.75; 3.75; 3.75; 3.75 MG/1; MG/1; MG/1; MG/1
15 TABLET ORAL 2 TIMES DAILY
Qty: 60 TABLET | Refills: 0 | OUTPATIENT
Start: 2021-08-05 | End: 2022-08-05

## 2021-08-26 ENCOUNTER — OFFICE VISIT (OUTPATIENT)
Dept: PSYCHIATRY | Facility: CLINIC | Age: 31
End: 2021-08-26

## 2021-08-26 VITALS
SYSTOLIC BLOOD PRESSURE: 130 MMHG | TEMPERATURE: 97.2 F | HEART RATE: 86 BPM | DIASTOLIC BLOOD PRESSURE: 82 MMHG | WEIGHT: 142 LBS | HEIGHT: 64 IN | BODY MASS INDEX: 24.24 KG/M2

## 2021-08-26 DIAGNOSIS — F22 DELUSIONAL DISORDER (HCC): ICD-10-CM

## 2021-08-26 DIAGNOSIS — F63.81 INTERMITTENT EXPLOSIVE DISORDER IN ADULT: ICD-10-CM

## 2021-08-26 DIAGNOSIS — F79 INTELLECTUAL DISABILITY: ICD-10-CM

## 2021-08-26 DIAGNOSIS — F90.2 ATTENTION DEFICIT HYPERACTIVITY DISORDER (ADHD), COMBINED TYPE: Primary | ICD-10-CM

## 2021-08-26 PROCEDURE — 99214 OFFICE O/P EST MOD 30 MIN: CPT | Performed by: NURSE PRACTITIONER

## 2021-08-26 RX ORDER — HYDROXYZINE PAMOATE 50 MG/1
50 CAPSULE ORAL 2 TIMES DAILY
Qty: 60 CAPSULE | Refills: 3 | Status: SHIPPED | OUTPATIENT
Start: 2021-08-26 | End: 2021-11-24 | Stop reason: SDUPTHER

## 2021-08-26 RX ORDER — ZONISAMIDE 100 MG/1
100 CAPSULE ORAL NIGHTLY
COMMUNITY
Start: 2021-08-24 | End: 2021-12-23

## 2021-08-26 RX ORDER — PROPRANOLOL HYDROCHLORIDE 20 MG/1
20 TABLET ORAL 3 TIMES DAILY
Qty: 90 TABLET | Refills: 3 | Status: SHIPPED | OUTPATIENT
Start: 2021-08-26 | End: 2021-11-24 | Stop reason: SDUPTHER

## 2021-08-26 RX ORDER — QUETIAPINE FUMARATE 200 MG/1
TABLET, FILM COATED ORAL
Qty: 60 TABLET | Refills: 3 | Status: SHIPPED | OUTPATIENT
Start: 2021-08-26 | End: 2021-11-24 | Stop reason: SDUPTHER

## 2021-08-26 RX ORDER — QUETIAPINE FUMARATE 400 MG/1
TABLET, FILM COATED ORAL
Qty: 30 TABLET | Refills: 3 | Status: SHIPPED | OUTPATIENT
Start: 2021-08-26 | End: 2021-11-24 | Stop reason: SDUPTHER

## 2021-08-26 NOTE — PROGRESS NOTES
Kymberly Torres is a 31 y.o. male is here today for medication management follow-up.    Chief Complaint:  Recheck on behaviors and hallucinations    History of Present Illness: Patient presents with  staff member   and gives permission to speak in front of.  Staff member is the main historian as patient is a very poor historian.  She states that he has been doing well.  The issues with the roommate are resolving as the roommate is being transferred out.  He still gets along with the remaining roommate.  He has not had any behavioral outbursts or physical aggression.  She has not noticed any evidence of hallucination and patient denies them in the office however as mentioned he is a poor historian.  Negative side effects to the medication.  No medical stressors.Body mass index is 24.36 kg/m².  sleeping well at night without difficulty.  .      The following portions of the patient's history were reviewed and updated as appropriate: allergies, current medications, past family history, past medical history, past social history, past surgical history and problem list.    Review of Systems   Constitutional: Negative for activity change, appetite change and fatigue.   HENT: Negative.    Eyes: Negative for visual disturbance.   Respiratory: Negative.    Cardiovascular: Negative.    Gastrointestinal: Negative for nausea.   Endocrine: Negative.    Genitourinary: Negative.    Musculoskeletal: Negative for arthralgias.   Skin: Negative.    Allergic/Immunologic: Negative.    Neurological: Negative for dizziness, seizures and headaches.   Hematological: Negative.    Psychiatric/Behavioral: Positive for agitation and behavioral problems. Negative for confusion, decreased concentration, dysphoric mood, hallucinations, self-injury, sleep disturbance and suicidal ideas. The patient is not nervous/anxious and is not hyperactive.      Reviewed copied data and there are no changes    Objective   Physical  "Exam  Vitals reviewed.   Constitutional:       Appearance: He is normal weight.   Musculoskeletal:      Cervical back: Normal range of motion.   Neurological:      Mental Status: He is alert.   Psychiatric:         Attention and Perception: He is inattentive.         Mood and Affect: Mood is anxious.         Behavior: Behavior is cooperative.         Cognition and Memory: Cognition is impaired.         Judgment: Judgment is impulsive.      Comments: Cooperative.  Speech is hard to understand.        Blood pressure 130/82, pulse 86, temperature 97.2 °F (36.2 °C), height 162.6 cm (64.02\"), weight 64.4 kg (142 lb).    Medication List:   Current Outpatient Medications   Medication Sig Dispense Refill   • amphetamine-dextroamphetamine (Adderall) 15 MG tablet Take 1 tablet by mouth 2 (Two) Times a Day. One at 7AM and one at noon 60 tablet 0   • benzoyl peroxide 5 % external wash Apply 148 doses topically to the appropriate area as directed 2 (two) times a day.     • gabapentin (NEURONTIN) 300 MG capsule Take 300 mg by mouth 3 (Three) Times a Day.     • hydrOXYzine pamoate (VISTARIL) 50 MG capsule Take 1 capsule by mouth 2 (two) times a day. 60 capsule 3   • levothyroxine (SYNTHROID, LEVOTHROID) 125 MCG tablet Take 125 mcg by mouth Daily.     • propranolol (INDERAL) 20 MG tablet Take 1 tablet by mouth 3 (Three) Times a Day. 90 tablet 3   • QUEtiapine (SEROquel) 200 MG tablet Take 1 table q aM and at 2 PM 60 tablet 3   • QUEtiapine (SEROquel) 400 MG tablet TAKE 1 TABLET BY MOUTH AT BEDTIME 30 tablet 3   • zonisamide (ZONEGRAN) 100 MG capsule Take 100 mg by mouth Every Night.       No current facility-administered medications for this visit.     Reviewed copied data and there are no changes    Mental Status Exam:   Hygiene:   fair  Cooperation:  Cooperative  Eye Contact:  Poor  Psychomotor Behavior:  Restless  Affect:  Blunted  Hopelessness: Denies  Speech:  difficult to understand  Thought Process:  Unable to " demonstrate  Thought Content:  Unable to demonstrate  Suicidal:  None  Homicidal:  None  Hallucinations:  Not demonstrated today  Delusion:  Unable to demonstrate  Memory:  Unable to evaluate  Orientation:  Unable to evaluate  Reliability:  poor  Insight:  Poor  Judgement:  Poor  Impulse Control:  Poor  Physical/Medical Issues:  Yes hypothyroidism    Assessment/Plan   Problems Addressed this Visit     None      Visit Diagnoses     Attention deficit hyperactivity disorder (ADHD), combined type    -  Primary    Relevant Medications    QUEtiapine (SEROquel) 400 MG tablet    QUEtiapine (SEROquel) 200 MG tablet    hydrOXYzine pamoate (VISTARIL) 50 MG capsule    Intermittent explosive disorder in adult        Relevant Medications    QUEtiapine (SEROquel) 400 MG tablet    QUEtiapine (SEROquel) 200 MG tablet    propranolol (INDERAL) 20 MG tablet    hydrOXYzine pamoate (VISTARIL) 50 MG capsule    Intellectual disability        Relevant Medications    QUEtiapine (SEROquel) 400 MG tablet    QUEtiapine (SEROquel) 200 MG tablet    hydrOXYzine pamoate (VISTARIL) 50 MG capsule    Delusional disorder (CMS/HCC)        Relevant Medications    QUEtiapine (SEROquel) 400 MG tablet    QUEtiapine (SEROquel) 200 MG tablet    hydrOXYzine pamoate (VISTARIL) 50 MG capsule      Diagnoses       Codes Comments    Attention deficit hyperactivity disorder (ADHD), combined type    -  Primary ICD-10-CM: F90.2  ICD-9-CM: 314.01     Intermittent explosive disorder in adult     ICD-10-CM: F63.81  ICD-9-CM: 312.34     Intellectual disability     ICD-10-CM: F79  ICD-9-CM: 319     Delusional disorder (CMS/HCC)     ICD-10-CM: F22  ICD-9-CM: 297.1           Functionality: pt having significant impairment in important areas of daily functioning. Lives in residential housing.    Prognosis: Guarded dependent on medication/follow up and treatment plan compliance.    He is to continue the Seroquel for the hallucinations, Inderal for the anxiety, Adderall for the  ADHD and impulsivity and Vistaril for anxiety.  Refills have been submitted.   .  Staff will notify me should the behaviors worsen or continue after the house mate moves out.        Staff to notify me should any problems develop.  He will RTC 3 months.  Sooner if needed.                    This document has been electronically signed by TAHIR Galloway on   August 26, 2021 11:10 EDT.

## 2021-09-01 DIAGNOSIS — F63.81 INTERMITTENT EXPLOSIVE DISORDER IN ADULT: ICD-10-CM

## 2021-09-01 DIAGNOSIS — F90.2 ATTENTION DEFICIT HYPERACTIVITY DISORDER (ADHD), COMBINED TYPE: ICD-10-CM

## 2021-09-01 RX ORDER — DEXTROAMPHETAMINE SACCHARATE, AMPHETAMINE ASPARTATE, DEXTROAMPHETAMINE SULFATE AND AMPHETAMINE SULFATE 3.75; 3.75; 3.75; 3.75 MG/1; MG/1; MG/1; MG/1
TABLET ORAL
Qty: 60 TABLET | Refills: 0 | Status: SHIPPED | OUTPATIENT
Start: 2021-09-01 | End: 2021-10-04 | Stop reason: SDUPTHER

## 2021-10-04 DIAGNOSIS — F90.2 ATTENTION DEFICIT HYPERACTIVITY DISORDER (ADHD), COMBINED TYPE: ICD-10-CM

## 2021-10-04 DIAGNOSIS — F63.81 INTERMITTENT EXPLOSIVE DISORDER IN ADULT: ICD-10-CM

## 2021-10-04 RX ORDER — DEXTROAMPHETAMINE SACCHARATE, AMPHETAMINE ASPARTATE, DEXTROAMPHETAMINE SULFATE AND AMPHETAMINE SULFATE 3.75; 3.75; 3.75; 3.75 MG/1; MG/1; MG/1; MG/1
15 TABLET ORAL 2 TIMES DAILY
Qty: 60 TABLET | Refills: 0 | Status: SHIPPED | OUTPATIENT
Start: 2021-10-04 | End: 2021-11-01 | Stop reason: SDUPTHER

## 2021-10-05 ENCOUNTER — TELEPHONE (OUTPATIENT)
Dept: PSYCHIATRY | Facility: CLINIC | Age: 31
End: 2021-10-05
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
## 2021-10-31 ENCOUNTER — PATIENT MESSAGE (OUTPATIENT)
Dept: PSYCHIATRY | Facility: CLINIC | Age: 31
End: 2021-10-31

## 2021-11-01 DIAGNOSIS — F63.81 INTERMITTENT EXPLOSIVE DISORDER IN ADULT: ICD-10-CM

## 2021-11-01 DIAGNOSIS — F90.2 ATTENTION DEFICIT HYPERACTIVITY DISORDER (ADHD), COMBINED TYPE: ICD-10-CM

## 2021-11-01 RX ORDER — DEXTROAMPHETAMINE SACCHARATE, AMPHETAMINE ASPARTATE, DEXTROAMPHETAMINE SULFATE AND AMPHETAMINE SULFATE 3.75; 3.75; 3.75; 3.75 MG/1; MG/1; MG/1; MG/1
15 TABLET ORAL 2 TIMES DAILY
Qty: 60 TABLET | Refills: 0 | Status: SHIPPED | OUTPATIENT
Start: 2021-11-01 | End: 2021-11-24 | Stop reason: SDUPTHER

## 2021-11-04 ENCOUNTER — TELEPHONE (OUTPATIENT)
Dept: PSYCHIATRY | Facility: CLINIC | Age: 31
End: 2021-11-04

## 2021-11-04 NOTE — TELEPHONE ENCOUNTER
I have spoke with the pharmacy. This is a mistake on there end and they are contacting the insurance company to resolve.

## 2021-11-04 NOTE — TELEPHONE ENCOUNTER
"Patients guardian, Elizabeth Torres called to say that patients adderall could not be filled due to [employer] ROLAND[facility] number not being \"active\" and she cant prescribe the medication.   I contacted the pharmacy at [facility] and spoke to Teena. She says when they run the patients medication through the insurance kicks back and says prescriber invalid. They are trying to fix this issue. Teena says they will contact the insurance and their tech team to get this resolved.    I called patients guardian to make aware, no answer and unable to leave message.   "

## 2021-11-24 ENCOUNTER — TELEMEDICINE (OUTPATIENT)
Dept: PSYCHIATRY | Facility: CLINIC | Age: 31
End: 2021-11-24

## 2021-11-24 DIAGNOSIS — Z79.899 LONG-TERM USE OF HIGH-RISK MEDICATION: ICD-10-CM

## 2021-11-24 DIAGNOSIS — F79 INTELLECTUAL DISABILITY: ICD-10-CM

## 2021-11-24 DIAGNOSIS — F63.81 INTERMITTENT EXPLOSIVE DISORDER IN ADULT: Primary | ICD-10-CM

## 2021-11-24 DIAGNOSIS — F22 DELUSIONAL DISORDER (HCC): ICD-10-CM

## 2021-11-24 DIAGNOSIS — F90.2 ATTENTION DEFICIT HYPERACTIVITY DISORDER (ADHD), COMBINED TYPE: ICD-10-CM

## 2021-11-24 PROCEDURE — 99214 OFFICE O/P EST MOD 30 MIN: CPT | Performed by: NURSE PRACTITIONER

## 2021-11-24 RX ORDER — QUETIAPINE FUMARATE 200 MG/1
TABLET, FILM COATED ORAL
Qty: 60 TABLET | Refills: 3 | Status: SHIPPED | OUTPATIENT
Start: 2021-11-24 | End: 2022-02-16 | Stop reason: SDUPTHER

## 2021-11-24 RX ORDER — QUETIAPINE FUMARATE 400 MG/1
TABLET, FILM COATED ORAL
Qty: 30 TABLET | Refills: 3 | Status: SHIPPED | OUTPATIENT
Start: 2021-11-24 | End: 2022-02-16 | Stop reason: SDUPTHER

## 2021-11-24 RX ORDER — PROPRANOLOL HYDROCHLORIDE 20 MG/1
20 TABLET ORAL 3 TIMES DAILY
Qty: 90 TABLET | Refills: 3 | Status: SHIPPED | OUTPATIENT
Start: 2021-11-24 | End: 2022-02-16 | Stop reason: SDUPTHER

## 2021-11-24 RX ORDER — DEXTROAMPHETAMINE SACCHARATE, AMPHETAMINE ASPARTATE, DEXTROAMPHETAMINE SULFATE AND AMPHETAMINE SULFATE 3.75; 3.75; 3.75; 3.75 MG/1; MG/1; MG/1; MG/1
15 TABLET ORAL 2 TIMES DAILY
Qty: 60 TABLET | Refills: 0 | Status: SHIPPED | OUTPATIENT
Start: 2021-11-24 | End: 2021-12-01 | Stop reason: SDUPTHER

## 2021-11-24 RX ORDER — HYDROXYZINE PAMOATE 25 MG/1
25 CAPSULE ORAL 2 TIMES DAILY PRN
Qty: 60 CAPSULE | Refills: 3 | Status: SHIPPED | OUTPATIENT
Start: 2021-11-24 | End: 2021-12-23 | Stop reason: SDUPTHER
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
## 2021-12-01 DIAGNOSIS — F90.2 ATTENTION DEFICIT HYPERACTIVITY DISORDER (ADHD), COMBINED TYPE: ICD-10-CM

## 2021-12-01 RX ORDER — DEXTROAMPHETAMINE SACCHARATE, AMPHETAMINE ASPARTATE, DEXTROAMPHETAMINE SULFATE AND AMPHETAMINE SULFATE 3.75; 3.75; 3.75; 3.75 MG/1; MG/1; MG/1; MG/1
15 TABLET ORAL 2 TIMES DAILY
Qty: 60 TABLET | Refills: 0 | Status: CANCELLED | OUTPATIENT
Start: 2021-12-01

## 2021-12-01 RX ORDER — DEXTROAMPHETAMINE SACCHARATE, AMPHETAMINE ASPARTATE, DEXTROAMPHETAMINE SULFATE AND AMPHETAMINE SULFATE 3.75; 3.75; 3.75; 3.75 MG/1; MG/1; MG/1; MG/1
15 TABLET ORAL 2 TIMES DAILY
Qty: 60 TABLET | Refills: 0 | Status: SHIPPED | OUTPATIENT
Start: 2021-12-01 | End: 2021-12-02 | Stop reason: SDUPTHER

## 2021-12-02 ENCOUNTER — TELEPHONE (OUTPATIENT)
Dept: FAMILY MEDICINE CLINIC | Facility: CLINIC | Age: 31
End: 2021-12-02

## 2021-12-02 ENCOUNTER — TELEPHONE (OUTPATIENT)
Dept: PSYCHIATRY | Facility: CLINIC | Age: 31
End: 2021-12-02

## 2021-12-02 DIAGNOSIS — F90.2 ATTENTION DEFICIT HYPERACTIVITY DISORDER (ADHD), COMBINED TYPE: ICD-10-CM

## 2021-12-02 RX ORDER — DEXTROAMPHETAMINE SACCHARATE, AMPHETAMINE ASPARTATE, DEXTROAMPHETAMINE SULFATE AND AMPHETAMINE SULFATE 3.75; 3.75; 3.75; 3.75 MG/1; MG/1; MG/1; MG/1
15 TABLET ORAL 2 TIMES DAILY
Qty: 60 TABLET | Refills: 0 | Status: SHIPPED | OUTPATIENT
Start: 2021-12-02 | End: 2021-12-30 | Stop reason: SDUPTHER

## 2021-12-02 NOTE — TELEPHONE ENCOUNTER
[facility]  BUT WITH THE WRONG DATE SO THEY NEEDS A NEW ONE CALLED I WITH THE CORRECT DATE

## 2021-12-02 NOTE — TELEPHONE ENCOUNTER
Call pts mom and tell her [facility] in [place] is saying my prescription will not go through there and I have no idea why as the [facility] here have to send to another pharmacy I guess.  So see what she wants me to do.  It has to be a problem on their end

## 2021-12-07 ENCOUNTER — TELEPHONE (OUTPATIENT)
Dept: FAMILY MEDICINE CLINIC | Facility: CLINIC | Age: 31
End: 2021-12-07

## 2021-12-07 NOTE — TELEPHONE ENCOUNTER
"Patients sister called indicating there was an \"issue\" every time the Adderal is filled.  She wasn't certain if it needs a PA from the insurance or what but she wanted Savita to call the pharmacy and find out.  After speaking with the pharmacy, they couldn't tell me what the problem was until the prescription is submitted again to the insurance, so this will have to be addressed at the next fill date once submitted again.     "

## 2021-12-23 ENCOUNTER — TELEPHONE (OUTPATIENT)
Dept: FAMILY MEDICINE CLINIC | Facility: CLINIC | Age: 31
End: 2021-12-23

## 2021-12-23 DIAGNOSIS — F63.81 INTERMITTENT EXPLOSIVE DISORDER IN ADULT: ICD-10-CM

## 2021-12-23 RX ORDER — HYDROXYZINE PAMOATE 25 MG/1
CAPSULE ORAL
Qty: 120 CAPSULE | Refills: 2 | Status: SHIPPED | OUTPATIENT
Start: 2021-12-23 | End: 2022-02-16 | Stop reason: SDUPTHER

## 2021-12-30 DIAGNOSIS — F90.2 ATTENTION DEFICIT HYPERACTIVITY DISORDER (ADHD), COMBINED TYPE: ICD-10-CM

## 2021-12-31 RX ORDER — DEXTROAMPHETAMINE SACCHARATE, AMPHETAMINE ASPARTATE, DEXTROAMPHETAMINE SULFATE AND AMPHETAMINE SULFATE 3.75; 3.75; 3.75; 3.75 MG/1; MG/1; MG/1; MG/1
15 TABLET ORAL 2 TIMES DAILY
Qty: 60 TABLET | Refills: 0 | Status: SHIPPED | OUTPATIENT
Start: 2021-12-31 | End: 2022-01-31 | Stop reason: SDUPTHER

## 2022-01-18 ENCOUNTER — TELEPHONE (OUTPATIENT)
Dept: FAMILY MEDICINE CLINIC | Facility: CLINIC | Age: 32
End: 2022-01-18

## 2022-01-18 NOTE — TELEPHONE ENCOUNTER
"Patients mother called stating that she doesn't think that the patients seroquel is working. She states that he has outburst and \"ripped up his pajama's\". She also states that he has put on weight and thinks its related to the medication.   "

## 2022-01-18 NOTE — TELEPHONE ENCOUNTER
Spoke with patients mother and explained. She voiced understanding but does not want the appt for Thursday at 4 due to possibility of snow. She wants to just keep appt for Feb.

## 2022-01-18 NOTE — TELEPHONE ENCOUNTER
I can see him this Thursday at 4 if she would like and we can discuss the meds.  I have not seen him quite a while as he moved in with her so I really need to see him in person before making any changes

## 2022-01-31 DIAGNOSIS — F90.2 ATTENTION DEFICIT HYPERACTIVITY DISORDER (ADHD), COMBINED TYPE: ICD-10-CM

## 2022-01-31 RX ORDER — DEXTROAMPHETAMINE SACCHARATE, AMPHETAMINE ASPARTATE, DEXTROAMPHETAMINE SULFATE AND AMPHETAMINE SULFATE 3.75; 3.75; 3.75; 3.75 MG/1; MG/1; MG/1; MG/1
15 TABLET ORAL 2 TIMES DAILY
Qty: 60 TABLET | Refills: 0 | Status: SHIPPED | OUTPATIENT
Start: 2022-01-31 | End: 2022-03-02 | Stop reason: SDUPTHER

## 2022-02-16 ENCOUNTER — HOSPITAL ENCOUNTER (EMERGENCY)
Facility: HOSPITAL | Age: 32
Discharge: HOME | End: 2022-02-16
Attending: STUDENT IN AN ORGANIZED HEALTH CARE EDUCATION/TRAINING PROGRAM | Admitting: STUDENT IN AN ORGANIZED HEALTH CARE EDUCATION/TRAINING PROGRAM

## 2022-02-16 ENCOUNTER — OFFICE VISIT (OUTPATIENT)
Dept: PSYCHIATRY | Facility: CLINIC | Age: 32
End: 2022-02-16

## 2022-02-16 DIAGNOSIS — Z00.8 ENCOUNTER FOR PSYCHOLOGICAL EVALUATION: Primary | ICD-10-CM

## 2022-02-16 DIAGNOSIS — F79 INTELLECTUAL DISABILITY: ICD-10-CM

## 2022-02-16 DIAGNOSIS — F90.2 ATTENTION DEFICIT HYPERACTIVITY DISORDER (ADHD), COMBINED TYPE: ICD-10-CM

## 2022-02-16 DIAGNOSIS — Z79.899 LONG-TERM USE OF HIGH-RISK MEDICATION: ICD-10-CM

## 2022-02-16 DIAGNOSIS — F63.81 INTERMITTENT EXPLOSIVE DISORDER IN ADULT: Primary | ICD-10-CM

## 2022-02-16 PROCEDURE — 99282 EMERGENCY DEPT VISIT SF MDM: CPT

## 2022-02-16 PROCEDURE — 99214 OFFICE O/P EST MOD 30 MIN: CPT | Performed by: NURSE PRACTITIONER

## 2022-02-16 RX ORDER — PROPRANOLOL HYDROCHLORIDE 20 MG/1
20 TABLET ORAL 3 TIMES DAILY
Qty: 270 TABLET | Refills: 1 | Status: SHIPPED | OUTPATIENT
Start: 2022-02-16 | End: 2022-04-20 | Stop reason: SDUPTHER

## 2022-02-16 RX ORDER — LEVOCETIRIZINE DIHYDROCHLORIDE 5 MG/1
5 TABLET, FILM COATED ORAL
COMMUNITY
Start: 2022-01-03 | End: 2023-01-24

## 2022-02-16 RX ORDER — QUETIAPINE FUMARATE 400 MG/1
TABLET, FILM COATED ORAL
Qty: 30 TABLET | Refills: 3 | Status: SHIPPED | OUTPATIENT
Start: 2022-02-16 | End: 2022-04-20 | Stop reason: SDUPTHER

## 2022-02-16 RX ORDER — QUETIAPINE FUMARATE 200 MG/1
TABLET, FILM COATED ORAL
Qty: 90 TABLET | Refills: 1 | Status: SHIPPED | OUTPATIENT
Start: 2022-02-16 | End: 2022-04-20 | Stop reason: SDUPTHER

## 2022-02-16 RX ORDER — LINACLOTIDE 145 UG/1
145 CAPSULE, GELATIN COATED ORAL DAILY
COMMUNITY
Start: 2022-01-10

## 2022-02-16 RX ORDER — HYDROXYZINE PAMOATE 50 MG/1
50 CAPSULE ORAL 2 TIMES DAILY
Qty: 180 CAPSULE | Refills: 1 | Status: SHIPPED | OUTPATIENT
Start: 2022-02-16 | End: 2022-04-20 | Stop reason: SDUPTHER

## 2022-02-16 RX ORDER — TRETINOIN 1 MG/G
CREAM TOPICAL
COMMUNITY
Start: 2022-01-31

## 2022-02-16 RX ORDER — LEVOTHYROXINE SODIUM 0.15 MG/1
150 TABLET ORAL DAILY
COMMUNITY
Start: 2022-02-11
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
## 2022-02-22 NOTE — PROGRESS NOTES
Spoke with pt's PCP janusz Davis [phone].  Explained situation to her about pt getting extremely agitated on long car ride here.  She states she will do UDs on him yearly to comply with requirements as I will be doing telehealth on him in the future.

## 2022-03-02 DIAGNOSIS — F90.2 ATTENTION DEFICIT HYPERACTIVITY DISORDER (ADHD), COMBINED TYPE: ICD-10-CM

## 2022-03-02 PROBLEM — G40.911 INTRACTABLE EPILEPSY WITH STATUS EPILEPTICUS: Status: ACTIVE | Noted: 2021-10-28

## 2022-03-02 PROBLEM — F79 INTELLECTUAL DISABILITY: Status: ACTIVE | Noted: 2021-10-28

## 2022-03-02 PROBLEM — F41.9 ANXIETY: Status: ACTIVE | Noted: 2021-10-28

## 2022-03-02 RX ORDER — DEXTROAMPHETAMINE SACCHARATE, AMPHETAMINE ASPARTATE, DEXTROAMPHETAMINE SULFATE AND AMPHETAMINE SULFATE 3.75; 3.75; 3.75; 3.75 MG/1; MG/1; MG/1; MG/1
15 TABLET ORAL 2 TIMES DAILY
Qty: 60 TABLET | Refills: 0 | Status: SHIPPED | OUTPATIENT
Start: 2022-03-02 | End: 2022-03-31 | Stop reason: SDUPTHER

## 2022-03-02 RX ORDER — OMEGA-3S/DHA/EPA/FISH OIL/D3 300MG-1000
400 CAPSULE ORAL
COMMUNITY

## 2022-03-02 RX ORDER — DEXTROAMPHETAMINE SACCHARATE, AMPHETAMINE ASPARTATE, DEXTROAMPHETAMINE SULFATE AND AMPHETAMINE SULFATE 3.75; 3.75; 3.75; 3.75 MG/1; MG/1; MG/1; MG/1
15 TABLET ORAL 2 TIMES DAILY
Qty: 60 TABLET | Refills: 0 | Status: CANCELLED | OUTPATIENT
Start: 2022-03-02

## 2022-03-02 RX ORDER — PSEUDOEPHEDRINE HCL 30 MG
TABLET ORAL
COMMUNITY

## 2022-03-31 DIAGNOSIS — F90.2 ATTENTION DEFICIT HYPERACTIVITY DISORDER (ADHD), COMBINED TYPE: ICD-10-CM

## 2022-03-31 RX ORDER — DEXTROAMPHETAMINE SACCHARATE, AMPHETAMINE ASPARTATE, DEXTROAMPHETAMINE SULFATE AND AMPHETAMINE SULFATE 3.75; 3.75; 3.75; 3.75 MG/1; MG/1; MG/1; MG/1
15 TABLET ORAL 2 TIMES DAILY
Qty: 60 TABLET | Refills: 0 | Status: SHIPPED | OUTPATIENT
Start: 2022-03-31 | End: 2022-04-28 | Stop reason: SDUPTHER

## 2022-04-20 ENCOUNTER — TELEMEDICINE (OUTPATIENT)
Dept: PSYCHIATRY | Facility: CLINIC | Age: 32
End: 2022-04-20

## 2022-04-20 DIAGNOSIS — F63.81 INTERMITTENT EXPLOSIVE DISORDER IN ADULT: ICD-10-CM

## 2022-04-20 DIAGNOSIS — Z79.899 LONG-TERM USE OF HIGH-RISK MEDICATION: ICD-10-CM

## 2022-04-20 DIAGNOSIS — F79 INTELLECTUAL DISABILITY: ICD-10-CM

## 2022-04-20 DIAGNOSIS — F90.2 ATTENTION DEFICIT HYPERACTIVITY DISORDER (ADHD), COMBINED TYPE: Primary | ICD-10-CM

## 2022-04-20 PROCEDURE — 99214 OFFICE O/P EST MOD 30 MIN: CPT | Performed by: NURSE PRACTITIONER

## 2022-04-20 RX ORDER — QUETIAPINE FUMARATE 200 MG/1
TABLET, FILM COATED ORAL
Qty: 180 TABLET | Refills: 0 | Status: SHIPPED | OUTPATIENT
Start: 2022-04-20 | End: 2022-07-20 | Stop reason: SDUPTHER

## 2022-04-20 RX ORDER — LEVOCETIRIZINE DIHYDROCHLORIDE 5 MG/1
5 TABLET, FILM COATED ORAL
COMMUNITY
Start: 2022-03-28 | End: 2022-09-21

## 2022-04-20 RX ORDER — QUETIAPINE FUMARATE 400 MG/1
TABLET, FILM COATED ORAL
Qty: 90 TABLET | Refills: 0 | Status: SHIPPED | OUTPATIENT
Start: 2022-04-20 | End: 2022-07-20 | Stop reason: SDUPTHER

## 2022-04-20 RX ORDER — HYDROXYZINE PAMOATE 50 MG/1
50 CAPSULE ORAL 2 TIMES DAILY
Qty: 180 CAPSULE | Refills: 0 | Status: SHIPPED | OUTPATIENT
Start: 2022-04-20 | End: 2022-07-20 | Stop reason: SDUPTHER

## 2022-04-20 RX ORDER — PROPRANOLOL HYDROCHLORIDE 20 MG/1
20 TABLET ORAL 3 TIMES DAILY
Qty: 270 TABLET | Refills: 0 | Status: SHIPPED | OUTPATIENT
Start: 2022-04-20 | End: 2022-07-20 | Stop reason: SDUPTHER

## 2022-04-20 RX ORDER — GABAPENTIN 300 MG/1
300 CAPSULE ORAL 4 TIMES DAILY
COMMUNITY
Start: 2022-04-07

## 2022-04-20 NOTE — PROGRESS NOTES
Subjective   Patient ID: Arley Torres is a 32 y.o. male is being seen today through a telehealth visit.  Patient is located at home near [facility].  Provider is located in the office.  Mother and sister are present on the video visit as the historians as patient is intellectually disabled.  They gave permission for the visit to occur via the video..   CC:  Recheck on behaviors  History of Present Illness  Mom states that patient has been doing pretty well.  He has had approximately 1-3 seizures a month.  She states he had a really bad seizure approximately 3 weeks ago.  This is being followed by neurology.  Not having any outbursts but states that if he does it is usually right before or right after a seizure.  He had a bad 1 just before his last seizure that was approximately 2 hours long.  Usually he is able to be redirected.  She states that he has been sleeping well at night.  No negative side effects to the medication. No physical aggression or destruction of property.  Says he continues to have trust issues and had an outburst when going to the Dr.  With his mother.  Gets regular lab work from his PcP. Both mom and sister state the pt is much worse without the adderall as far as irritability and agitation   The following portions of the patient's history were reviewed and updated as appropriate: allergies, current medications, past family history, past medical history, past social history, past surgical history and problem list.    Review of Systems   Constitutional: Negative for activity change, appetite change and fatigue.   HENT: Negative.    Eyes: Negative for visual disturbance.   Respiratory: Negative.    Cardiovascular: Negative.    Gastrointestinal: Negative for nausea.   Endocrine: Negative.    Genitourinary: Negative.    Musculoskeletal: Negative for arthralgias.   Skin: Negative.    Allergic/Immunologic: Negative.    Neurological: Positive for seizures. Negative for dizziness and  headaches.   Hematological: Negative.    Psychiatric/Behavioral: Positive for agitation. Negative for behavioral problems, confusion, decreased concentration, dysphoric mood, hallucinations, self-injury, sleep disturbance and suicidal ideas. The patient is not nervous/anxious and is not hyperactive.        Objective   Mental Status Exam  Appearance:  clean and casually dressed, appropriate  Attitude toward clinician:  pt ignored me on the visit  Speech:    Rate:  speech impediment present.  hard to understand   Volume:  normal  Motor:  no abnormal movements present  Mood:  Cooperative with family  Affect:  blunted  Thought Processes:  unable to evaluate  Thought Content:  unable to evaluate  Suicidal Thoughts:  absent  Homicidal Thoughts:  absent  Perceptual Disturbance: no perceptual disturbance  Attention and Concentration:  poor  Insight and Judgement:  poor  Memory:  unable to evaluate  Physical Exam  Musculoskeletal:      Cervical back: Normal range of motion and neck supple.   Neurological:      General: No focal deficit present.      Mental Status: He is alert.   Psychiatric:         Judgment: Judgment is impulsive.      Comments: Very interactive with his family.  Would not sit and converse with myself       Lab Review:   not applicable  Assessment/Plan   Diagnoses and all orders for this visit:    1. Attention deficit hyperactivity disorder (ADHD), combined type (Primary)    2. Intermittent explosive disorder in adult  -     QUEtiapine (SEROquel) 400 MG tablet; TAKE 1 TABLET BY MOUTH AT BEDTIME  Dispense: 90 tablet; Refill: 0  -     QUEtiapine (SEROquel) 200 MG tablet; Take 1 table q aM and at 2 PM  Dispense: 180 tablet; Refill: 0  -     propranolol (INDERAL) 20 MG tablet; Take 1 tablet by mouth 3 (Three) Times a Day.  Dispense: 270 tablet; Refill: 0  -     hydrOXYzine pamoate (VISTARIL) 50 MG capsule; Take 1 capsule by mouth 2 (Two) Times a Day.  Dispense: 180 capsule; Refill: 0    3. Intellectual  disability    4. Long-term use of high-risk medication    his UDs from his neurologist was reviewed and appropriate.  alissa reviewed.      I am keeping him on his current medications.  He is to continue the Seroquel for the agitation, the Inderal for the anxiety, hydroxyzine for the anxiety, the Adderall for the ADHD.  Appropriate refills have been submitted.  Mom and sister are in agreement with this.  Overall patient is doing much better and will continue to have some outbursts however right now seeming to be more related to the seizure disorder.  We will continue getting labs at his primary care physician.  I will notify me of any issues.  No follow-ups on file.

## 2022-04-28 DIAGNOSIS — F90.2 ATTENTION DEFICIT HYPERACTIVITY DISORDER (ADHD), COMBINED TYPE: ICD-10-CM

## 2022-04-28 RX ORDER — DEXTROAMPHETAMINE SACCHARATE, AMPHETAMINE ASPARTATE, DEXTROAMPHETAMINE SULFATE AND AMPHETAMINE SULFATE 3.75; 3.75; 3.75; 3.75 MG/1; MG/1; MG/1; MG/1
15 TABLET ORAL 2 TIMES DAILY
Qty: 60 TABLET | Refills: 0 | Status: SHIPPED | OUTPATIENT
Start: 2022-04-28 | End: 2022-05-31 | Stop reason: SDUPTHER

## 2022-05-31 DIAGNOSIS — F90.2 ATTENTION DEFICIT HYPERACTIVITY DISORDER (ADHD), COMBINED TYPE: ICD-10-CM

## 2022-05-31 RX ORDER — DEXTROAMPHETAMINE SACCHARATE, AMPHETAMINE ASPARTATE, DEXTROAMPHETAMINE SULFATE AND AMPHETAMINE SULFATE 3.75; 3.75; 3.75; 3.75 MG/1; MG/1; MG/1; MG/1
15 TABLET ORAL 2 TIMES DAILY
Qty: 60 TABLET | Refills: 0 | Status: SHIPPED | OUTPATIENT
Start: 2022-05-31 | End: 2022-06-29 | Stop reason: SDUPTHER

## 2022-06-29 DIAGNOSIS — F90.2 ATTENTION DEFICIT HYPERACTIVITY DISORDER (ADHD), COMBINED TYPE: ICD-10-CM

## 2022-06-29 RX ORDER — DEXTROAMPHETAMINE SACCHARATE, AMPHETAMINE ASPARTATE, DEXTROAMPHETAMINE SULFATE AND AMPHETAMINE SULFATE 3.75; 3.75; 3.75; 3.75 MG/1; MG/1; MG/1; MG/1
15 TABLET ORAL 2 TIMES DAILY
Qty: 60 TABLET | Refills: 0 | Status: SHIPPED | OUTPATIENT
Start: 2022-06-29 | End: 2022-08-01 | Stop reason: SDUPTHER

## 2022-06-29 NOTE — TELEPHONE ENCOUNTER
Jackie Torres called to request a refill on the following medication     amphetamine-dextroamphetamine (ADDERALL) 15 MG tablet

## 2022-07-20 ENCOUNTER — TELEMEDICINE (OUTPATIENT)
Dept: PSYCHIATRY | Facility: CLINIC | Age: 32
End: 2022-07-20

## 2022-07-20 DIAGNOSIS — F63.81 INTERMITTENT EXPLOSIVE DISORDER IN ADULT: ICD-10-CM

## 2022-07-20 DIAGNOSIS — Z79.899 LONG-TERM USE OF HIGH-RISK MEDICATION: ICD-10-CM

## 2022-07-20 DIAGNOSIS — F79 INTELLECTUAL DISABILITY: ICD-10-CM

## 2022-07-20 DIAGNOSIS — F90.2 ATTENTION DEFICIT HYPERACTIVITY DISORDER (ADHD), COMBINED TYPE: Primary | ICD-10-CM

## 2022-07-20 DIAGNOSIS — F42.2 MIXED OBSESSIONAL THOUGHTS AND ACTS: ICD-10-CM

## 2022-07-20 PROCEDURE — 99214 OFFICE O/P EST MOD 30 MIN: CPT | Performed by: NURSE PRACTITIONER

## 2022-07-20 RX ORDER — PROPRANOLOL HYDROCHLORIDE 20 MG/1
20 TABLET ORAL 3 TIMES DAILY
Qty: 270 TABLET | Refills: 0 | Status: SHIPPED | OUTPATIENT
Start: 2022-07-20 | End: 2022-10-11

## 2022-07-20 RX ORDER — HYDROXYZINE PAMOATE 50 MG/1
50 CAPSULE ORAL 2 TIMES DAILY
Qty: 180 CAPSULE | Refills: 0 | Status: SHIPPED | OUTPATIENT
Start: 2022-07-20 | End: 2022-10-11

## 2022-07-20 RX ORDER — QUETIAPINE FUMARATE 200 MG/1
TABLET, FILM COATED ORAL
Qty: 180 TABLET | Refills: 0 | Status: SHIPPED | OUTPATIENT
Start: 2022-07-20 | End: 2022-09-28 | Stop reason: SDUPTHER

## 2022-07-20 RX ORDER — DULOXETIN HYDROCHLORIDE 30 MG/1
30 CAPSULE, DELAYED RELEASE ORAL DAILY
Qty: 30 CAPSULE | Refills: 1 | Status: SHIPPED | OUTPATIENT
Start: 2022-07-20 | End: 2022-08-24 | Stop reason: SDUPTHER

## 2022-07-20 RX ORDER — QUETIAPINE FUMARATE 400 MG/1
TABLET, FILM COATED ORAL
Qty: 90 TABLET | Refills: 0 | Status: SHIPPED | OUTPATIENT
Start: 2022-07-20 | End: 2022-09-28 | Stop reason: SDUPTHER

## 2022-07-20 NOTE — PROGRESS NOTES
Subjective   Patient ID: Arley Torres is a 32 y.o. male is being seen today through a telehealth visit.  Patient is located at home near [facility].  Provider is located in the office at [address] [place] [address].    Mother and sister are present on the video visit as the historians as patient is intellectually disabled.  They gave permission for the visit to occur via the video..   CC:  Recheck on behaviors  History of Present Illness  Mom is the main historian on the video.  Mom states that patient continues to have some behaviors.  He continues to have some outbursts but for the most part he is able to be redirected.  He gets very upset when he is told no or that he cannot do something.  He gets very upset when he has to leave the house still.  He is sleeping well at night.  She does believe the Adderall helps as she states that he is much more hyperactive and cannot sit still without the medicine.  She and his sister both state however that patient will obsess on things and will talk about certain things for hours up to days and she says that it is nonstop.  She gave an example of railroad tracks and that he will obsess on Rheumatrex and worries about what railroad tracks and not stop talking about them.  No medical stressors.  No negative side effects to the current meds.  He is not exhibiting any depressive symptoms by crying etc.  The following portions of the patient's history were reviewed and updated as appropriate: allergies, current medications, past family history, past medical history, past social history, past surgical history and problem list.    Review of Systems   Constitutional: Negative for activity change, appetite change and fatigue.   HENT: Negative.    Eyes: Negative for visual disturbance.   Respiratory: Negative.    Cardiovascular: Negative.    Gastrointestinal: Negative for nausea.   Endocrine: Negative.    Genitourinary: Negative.    Musculoskeletal: Negative for arthralgias.    Skin: Negative.    Allergic/Immunologic: Negative.    Neurological: Positive for seizures. Negative for dizziness and headaches.   Hematological: Negative.    Psychiatric/Behavioral: Positive for agitation. Negative for behavioral problems, confusion, decreased concentration, dysphoric mood, hallucinations, self-injury, sleep disturbance and suicidal ideas. The patient is not nervous/anxious and is not hyperactive.        Objective   Mental Status Exam  Appearance:  clean and casually dressed, appropriate  Attitude toward clinician:  pt ignored me on the visit  Speech:    Rate:  speech impediment present.  hard to understand   Volume:  normal  Motor:  no abnormal movements present  Mood:  Cooperative with family  Affect:  blunted  Thought Processes:  unable to evaluate  Thought Content:  unable to evaluate  Suicidal Thoughts:  absent  Homicidal Thoughts:  absent  Perceptual Disturbance: no perceptual disturbance  Attention and Concentration:  poor  Insight and Judgement:  poor  Memory:  unable to evaluate  Physical Exam  Musculoskeletal:      Cervical back: Normal range of motion and neck supple.   Neurological:      General: No focal deficit present.      Mental Status: He is alert.   Psychiatric:         Judgment: Judgment is impulsive.      Comments: Very interactive with his family.  Would not sit and converse with myself       Lab Review:   not applicable  Assessment & Plan   Diagnoses and all orders for this visit:    1. Attention deficit hyperactivity disorder (ADHD), combined type (Primary)    2. Intermittent explosive disorder in adult  -     QUEtiapine (SEROquel) 400 MG tablet; TAKE 1 TABLET BY MOUTH AT BEDTIME  Dispense: 90 tablet; Refill: 0  -     QUEtiapine (SEROquel) 200 MG tablet; Take 1 table q aM and at 2 PM  Dispense: 180 tablet; Refill: 0  -     propranolol (INDERAL) 20 MG tablet; Take 1 tablet by mouth 3 (Three) Times a Day.  Dispense: 270 tablet; Refill: 0  -     hydrOXYzine pamoate (VISTARIL)  50 MG capsule; Take 1 capsule by mouth 2 (Two) Times a Day.  Dispense: 180 capsule; Refill: 0  -     DULoxetine (CYMBALTA) 30 MG capsule; Take 1 capsule by mouth Daily.  Dispense: 30 capsule; Refill: 1    3. Intellectual disability    4. Long-term use of high-risk medication    his UDs from his neurologist was reviewed and appropriate.  alissa reviewed.      I am keeping him on his current medications.  He is to continue the Seroquel for the agitation, the Inderal for the anxiety, hydroxyzine for the anxiety, the Adderall for the ADHD.  We did talk about medications for his obsessional thinking.  I explained to them that I have seen meds work in this instance and other times they may not.  He had some type of rash reaction to Lexapro in the past therefore I am going to try him on a little Cymbalta as mom would like him tried on a medicine.  Risks, benefits, side effects of the medicine were discussed.  So the onset of action was discussed.  If the Cymbalta were to make patient more aggressive or any negative side effects she will contact me and let me know.  I also explained to her that the Cymbalta was being written off label for the obsessional thinking.  I will follow up with her in approximately 5 weeks to see how things are going.  No follow-ups on file.

## 2022-08-01 DIAGNOSIS — F90.2 ATTENTION DEFICIT HYPERACTIVITY DISORDER (ADHD), COMBINED TYPE: ICD-10-CM

## 2022-08-01 RX ORDER — DEXTROAMPHETAMINE SACCHARATE, AMPHETAMINE ASPARTATE, DEXTROAMPHETAMINE SULFATE AND AMPHETAMINE SULFATE 3.75; 3.75; 3.75; 3.75 MG/1; MG/1; MG/1; MG/1
15 TABLET ORAL 2 TIMES DAILY
Qty: 60 TABLET | Refills: 0 | Status: SHIPPED | OUTPATIENT
Start: 2022-08-01 | End: 2022-08-30 | Stop reason: SDUPTHER

## 2022-08-21 DIAGNOSIS — F63.81 INTERMITTENT EXPLOSIVE DISORDER IN ADULT: ICD-10-CM

## 2022-08-22 RX ORDER — HYDROXYZINE PAMOATE 50 MG/1
CAPSULE ORAL
Qty: 180 CAPSULE | Refills: 0 | OUTPATIENT
Start: 2022-08-22

## 2022-08-24 ENCOUNTER — TELEMEDICINE (OUTPATIENT)
Dept: PSYCHIATRY | Facility: CLINIC | Age: 32
End: 2022-08-24

## 2022-08-24 DIAGNOSIS — F42.2 MIXED OBSESSIONAL THOUGHTS AND ACTS: ICD-10-CM

## 2022-08-24 DIAGNOSIS — F63.81 INTERMITTENT EXPLOSIVE DISORDER IN ADULT: ICD-10-CM

## 2022-08-24 PROCEDURE — 99214 OFFICE O/P EST MOD 30 MIN: CPT | Performed by: NURSE PRACTITIONER

## 2022-08-24 RX ORDER — DULOXETIN HYDROCHLORIDE 60 MG/1
60 CAPSULE, DELAYED RELEASE ORAL DAILY
Qty: 30 CAPSULE | Refills: 0 | Status: SHIPPED | OUTPATIENT
Start: 2022-08-24 | End: 2022-09-23

## 2022-08-24 RX ORDER — GABAPENTIN 300 MG/1
300 CAPSULE ORAL
COMMUNITY
Start: 2022-08-12 | End: 2023-01-24

## 2022-08-24 RX ORDER — LAMOTRIGINE 25 MG/1
TABLET ORAL
COMMUNITY
Start: 2022-05-16 | End: 2022-10-11
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
## 2022-08-30 DIAGNOSIS — F90.2 ATTENTION DEFICIT HYPERACTIVITY DISORDER (ADHD), COMBINED TYPE: ICD-10-CM

## 2022-08-30 RX ORDER — DEXTROAMPHETAMINE SACCHARATE, AMPHETAMINE ASPARTATE, DEXTROAMPHETAMINE SULFATE AND AMPHETAMINE SULFATE 3.75; 3.75; 3.75; 3.75 MG/1; MG/1; MG/1; MG/1
15 TABLET ORAL 2 TIMES DAILY
Qty: 60 TABLET | Refills: 0 | Status: SHIPPED | OUTPATIENT
Start: 2022-08-30 | End: 2022-09-28 | Stop reason: SDUPTHER

## 2022-08-30 NOTE — TELEPHONE ENCOUNTER
Patient called to request a refill on the following medication     amphetamine-dextroamphetamine (ADDERALL) 15 MG tablet

## 2022-09-21 ENCOUNTER — TELEMEDICINE (OUTPATIENT)
Dept: PSYCHIATRY | Facility: CLINIC | Age: 32
End: 2022-09-21

## 2022-09-21 DIAGNOSIS — F63.81 INTERMITTENT EXPLOSIVE DISORDER IN ADULT: ICD-10-CM

## 2022-09-21 DIAGNOSIS — F42.2 MIXED OBSESSIONAL THOUGHTS AND ACTS: ICD-10-CM

## 2022-09-21 DIAGNOSIS — Z79.899 LONG-TERM USE OF HIGH-RISK MEDICATION: ICD-10-CM

## 2022-09-21 DIAGNOSIS — F90.2 ATTENTION DEFICIT HYPERACTIVITY DISORDER (ADHD), COMBINED TYPE: ICD-10-CM

## 2022-09-21 DIAGNOSIS — F79 INTELLECTUAL DISABILITY: Primary | ICD-10-CM

## 2022-09-21 PROCEDURE — 99214 OFFICE O/P EST MOD 30 MIN: CPT | Performed by: NURSE PRACTITIONER

## 2022-09-21 RX ORDER — CLONAZEPAM 1 MG/1
1 TABLET ORAL DAILY
Qty: 30 TABLET | Refills: 0 | Status: SHIPPED | OUTPATIENT
Start: 2022-09-21 | End: 2022-09-28 | Stop reason: SDUPTHER
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
## 2022-09-23 DIAGNOSIS — F42.2 MIXED OBSESSIONAL THOUGHTS AND ACTS: ICD-10-CM

## 2022-09-23 DIAGNOSIS — F63.81 INTERMITTENT EXPLOSIVE DISORDER IN ADULT: ICD-10-CM

## 2022-09-23 RX ORDER — DULOXETIN HYDROCHLORIDE 60 MG/1
60 CAPSULE, DELAYED RELEASE ORAL DAILY
Qty: 30 CAPSULE | Refills: 0 | Status: SHIPPED | OUTPATIENT
Start: 2022-09-23 | End: 2022-09-28 | Stop reason: SDUPTHER

## 2022-09-28 ENCOUNTER — TELEMEDICINE (OUTPATIENT)
Dept: PSYCHIATRY | Facility: CLINIC | Age: 32
End: 2022-09-28

## 2022-09-28 ENCOUNTER — TELEPHONE (OUTPATIENT)
Dept: FAMILY MEDICINE CLINIC | Facility: CLINIC | Age: 32
End: 2022-09-28

## 2022-09-28 DIAGNOSIS — F90.2 ATTENTION DEFICIT HYPERACTIVITY DISORDER (ADHD), COMBINED TYPE: ICD-10-CM

## 2022-09-28 DIAGNOSIS — F42.2 MIXED OBSESSIONAL THOUGHTS AND ACTS: ICD-10-CM

## 2022-09-28 DIAGNOSIS — F79 INTELLECTUAL DISABILITY: Primary | ICD-10-CM

## 2022-09-28 DIAGNOSIS — F63.81 INTERMITTENT EXPLOSIVE DISORDER IN ADULT: ICD-10-CM

## 2022-09-28 DIAGNOSIS — Z79.899 LONG-TERM USE OF HIGH-RISK MEDICATION: ICD-10-CM

## 2022-09-28 PROCEDURE — 99214 OFFICE O/P EST MOD 30 MIN: CPT | Performed by: NURSE PRACTITIONER

## 2022-09-28 RX ORDER — QUETIAPINE FUMARATE 200 MG/1
TABLET, FILM COATED ORAL
Qty: 7 TABLET | Refills: 0 | Status: SHIPPED | OUTPATIENT
Start: 2022-09-28 | End: 2022-10-26 | Stop reason: SDUPTHER

## 2022-09-28 RX ORDER — DULOXETIN HYDROCHLORIDE 60 MG/1
60 CAPSULE, DELAYED RELEASE ORAL DAILY
Qty: 30 CAPSULE | Refills: 0 | Status: SHIPPED | OUTPATIENT
Start: 2022-09-28 | End: 2022-10-21 | Stop reason: SDUPTHER

## 2022-09-28 RX ORDER — CLONAZEPAM 1 MG/1
1 TABLET ORAL 2 TIMES DAILY PRN
Qty: 60 TABLET | Refills: 0 | Status: SHIPPED | OUTPATIENT
Start: 2022-09-28 | End: 2022-12-08 | Stop reason: SDUPTHER

## 2022-09-28 RX ORDER — DEXTROAMPHETAMINE SACCHARATE, AMPHETAMINE ASPARTATE, DEXTROAMPHETAMINE SULFATE AND AMPHETAMINE SULFATE 3.75; 3.75; 3.75; 3.75 MG/1; MG/1; MG/1; MG/1
15 TABLET ORAL 2 TIMES DAILY
Qty: 60 TABLET | Refills: 0 | Status: SHIPPED | OUTPATIENT
Start: 2022-09-28 | End: 2022-10-11

## 2022-09-28 RX ORDER — QUETIAPINE FUMARATE 400 MG/1
TABLET, FILM COATED ORAL
Qty: 30 TABLET | Refills: 0 | Status: SHIPPED | OUTPATIENT
Start: 2022-09-28 | End: 2022-10-26 | Stop reason: SDUPTHER

## 2022-09-28 NOTE — TELEPHONE ENCOUNTER
prior auth sent to insurance for Vraylar 1.5 and 3mg    Insurance approved     Spoke with Elizabeth she verbally understood
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
## 2022-09-29 NOTE — PROGRESS NOTES
Spoke with Dr. Lopez and informed him that I had instructed family to decrease his lamictal back down to 75mg due to increase in aggressive behavior.  He states that is fine.

## 2022-10-03 DIAGNOSIS — F63.81 INTERMITTENT EXPLOSIVE DISORDER IN ADULT: ICD-10-CM

## 2022-10-03 RX ORDER — CARIPRAZINE 1.5 MG/1
CAPSULE, GELATIN COATED ORAL
Qty: 7 CAPSULE | Refills: 0 | OUTPATIENT
Start: 2022-10-03

## 2022-10-03 NOTE — TELEPHONE ENCOUNTER
Elizabeth called to let you know Arley was at [facility] in [place] , was admitted on Thursday

## 2022-10-11 ENCOUNTER — TELEMEDICINE (OUTPATIENT)
Dept: PSYCHIATRY | Facility: CLINIC | Age: 32
End: 2022-10-11

## 2022-10-11 DIAGNOSIS — F79 INTELLECTUAL DISABILITY: Primary | ICD-10-CM

## 2022-10-11 DIAGNOSIS — F63.81 INTERMITTENT EXPLOSIVE DISORDER IN ADULT: ICD-10-CM

## 2022-10-11 DIAGNOSIS — Z79.899 LONG-TERM USE OF HIGH-RISK MEDICATION: ICD-10-CM

## 2022-10-11 DIAGNOSIS — F90.2 ATTENTION DEFICIT HYPERACTIVITY DISORDER (ADHD), COMBINED TYPE: ICD-10-CM

## 2022-10-11 DIAGNOSIS — F42.2 MIXED OBSESSIONAL THOUGHTS AND ACTS: ICD-10-CM

## 2022-10-11 PROCEDURE — 99213 OFFICE O/P EST LOW 20 MIN: CPT | Performed by: NURSE PRACTITIONER

## 2022-10-11 RX ORDER — PHENOL 1.4 %
AEROSOL, SPRAY (ML) MUCOUS MEMBRANE NIGHTLY
COMMUNITY
End: 2023-01-24 | Stop reason: SDUPTHER

## 2022-10-11 RX ORDER — LAMOTRIGINE 100 MG/1
100 TABLET ORAL 2 TIMES DAILY
COMMUNITY
Start: 2022-10-10 | End: 2023-01-24 | Stop reason: SDUPTHER
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
## 2022-10-21 DIAGNOSIS — F63.81 INTERMITTENT EXPLOSIVE DISORDER IN ADULT: ICD-10-CM

## 2022-10-21 DIAGNOSIS — F42.2 MIXED OBSESSIONAL THOUGHTS AND ACTS: ICD-10-CM

## 2022-10-21 RX ORDER — DULOXETIN HYDROCHLORIDE 60 MG/1
CAPSULE, DELAYED RELEASE ORAL
Qty: 60 CAPSULE | Refills: 2 | Status: SHIPPED | OUTPATIENT
Start: 2022-10-21 | End: 2022-11-09 | Stop reason: SDUPTHER

## 2022-10-26 ENCOUNTER — TELEPHONE (OUTPATIENT)
Dept: FAMILY MEDICINE CLINIC | Facility: CLINIC | Age: 32
End: 2022-10-26

## 2022-10-26 DIAGNOSIS — F63.81 INTERMITTENT EXPLOSIVE DISORDER IN ADULT: ICD-10-CM

## 2022-10-26 RX ORDER — QUETIAPINE FUMARATE 400 MG/1
TABLET, FILM COATED ORAL
Qty: 30 TABLET | Refills: 2 | Status: SHIPPED | OUTPATIENT
Start: 2022-10-26 | End: 2023-01-24 | Stop reason: SDUPTHER

## 2022-10-26 RX ORDER — QUETIAPINE FUMARATE 200 MG/1
TABLET, FILM COATED ORAL
Qty: 60 TABLET | Refills: 2 | Status: SHIPPED | OUTPATIENT
Start: 2022-10-26 | End: 2023-01-24 | Stop reason: SDUPTHER

## 2022-10-26 NOTE — TELEPHONE ENCOUNTER
A refill request was faxed in from the pharmacy. The request is for Quetiapine 200mg 1 tablet in the morning and 2 tablets in the evening. I do not see a previous order for this prescription, please advise.

## 2022-11-09 ENCOUNTER — TELEMEDICINE (OUTPATIENT)
Dept: PSYCHIATRY | Facility: CLINIC | Age: 32
End: 2022-11-09

## 2022-11-09 DIAGNOSIS — F63.81 INTERMITTENT EXPLOSIVE DISORDER IN ADULT: Primary | ICD-10-CM

## 2022-11-09 DIAGNOSIS — F79 INTELLECTUAL DISABILITY: ICD-10-CM

## 2022-11-09 DIAGNOSIS — F42.2 MIXED OBSESSIONAL THOUGHTS AND ACTS: ICD-10-CM

## 2022-11-09 PROCEDURE — 99213 OFFICE O/P EST LOW 20 MIN: CPT | Performed by: NURSE PRACTITIONER

## 2022-11-09 RX ORDER — DULOXETIN HYDROCHLORIDE 60 MG/1
CAPSULE, DELAYED RELEASE ORAL
Qty: 60 CAPSULE | Refills: 2 | Status: SHIPPED | OUTPATIENT
Start: 2022-11-09 | End: 2023-01-24 | Stop reason: SDUPTHER
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
## 2022-11-17 ENCOUNTER — TELEPHONE (OUTPATIENT)
Dept: FAMILY MEDICINE CLINIC | Facility: CLINIC | Age: 32
End: 2022-11-17

## 2022-11-17 NOTE — TELEPHONE ENCOUNTER
Elizabeth called to let you know Arley has moved to [place] but he will still be seeing you as a patient

## 2022-12-08 DIAGNOSIS — F63.81 INTERMITTENT EXPLOSIVE DISORDER IN ADULT: ICD-10-CM

## 2022-12-08 RX ORDER — CLONAZEPAM 0.5 MG/1
0.5 TABLET ORAL 2 TIMES DAILY
Qty: 60 TABLET | Refills: 0 | Status: SHIPPED | OUTPATIENT
Start: 2022-12-08 | End: 2023-01-06

## 2023-01-06 DIAGNOSIS — F63.81 INTERMITTENT EXPLOSIVE DISORDER IN ADULT: ICD-10-CM

## 2023-01-06 RX ORDER — CLONAZEPAM 0.5 MG/1
TABLET ORAL
Qty: 60 TABLET | Refills: 0 | Status: SHIPPED | OUTPATIENT
Start: 2023-01-06 | End: 2023-01-24 | Stop reason: SDUPTHER

## 2023-01-24 ENCOUNTER — OFFICE VISIT (OUTPATIENT)
Dept: PSYCHIATRY | Facility: CLINIC | Age: 33
End: 2023-01-24
Payer: MEDICARE

## 2023-01-24 VITALS
HEART RATE: 124 BPM | OXYGEN SATURATION: 93 % | DIASTOLIC BLOOD PRESSURE: 89 MMHG | WEIGHT: 160 LBS | SYSTOLIC BLOOD PRESSURE: 135 MMHG | TEMPERATURE: 97.8 F | HEIGHT: 64 IN | BODY MASS INDEX: 27.31 KG/M2

## 2023-01-24 DIAGNOSIS — F63.81 INTERMITTENT EXPLOSIVE DISORDER IN ADULT: ICD-10-CM

## 2023-01-24 DIAGNOSIS — F42.2 MIXED OBSESSIONAL THOUGHTS AND ACTS: ICD-10-CM

## 2023-01-24 DIAGNOSIS — F79 INTELLECTUAL DISABILITY: Primary | ICD-10-CM

## 2023-01-24 DIAGNOSIS — Z79.899 LONG-TERM USE OF HIGH-RISK MEDICATION: ICD-10-CM

## 2023-01-24 PROCEDURE — 99214 OFFICE O/P EST MOD 30 MIN: CPT | Performed by: NURSE PRACTITIONER

## 2023-01-24 RX ORDER — LAMOTRIGINE 100 MG/1
1 TABLET ORAL 2 TIMES DAILY
COMMUNITY
Start: 2022-11-28 | End: 2023-01-24

## 2023-01-24 RX ORDER — GABAPENTIN 300 MG/1
300 CAPSULE ORAL
COMMUNITY
Start: 2022-11-28 | End: 2023-01-24

## 2023-01-24 RX ORDER — CLONAZEPAM 0.5 MG/1
0.5 TABLET ORAL 2 TIMES DAILY
Qty: 60 TABLET | Refills: 3 | Status: SHIPPED | OUTPATIENT
Start: 2023-01-24

## 2023-01-24 RX ORDER — DULOXETIN HYDROCHLORIDE 60 MG/1
CAPSULE, DELAYED RELEASE ORAL
Qty: 60 CAPSULE | Refills: 3 | Status: SHIPPED | OUTPATIENT
Start: 2023-01-24

## 2023-01-24 RX ORDER — QUETIAPINE FUMARATE 200 MG/1
TABLET, FILM COATED ORAL
Qty: 60 TABLET | Refills: 3 | Status: SHIPPED | OUTPATIENT
Start: 2023-01-24

## 2023-01-24 RX ORDER — LAMOTRIGINE 100 MG/1
100 TABLET ORAL 2 TIMES DAILY
Qty: 60 TABLET | Refills: 3 | Status: SHIPPED | OUTPATIENT
Start: 2023-01-24

## 2023-01-24 RX ORDER — PHENOL 1.4 %
10 AEROSOL, SPRAY (ML) MUCOUS MEMBRANE NIGHTLY
Qty: 30 TABLET | Refills: 3 | Status: SHIPPED | OUTPATIENT
Start: 2023-01-24

## 2023-01-24 RX ORDER — QUETIAPINE FUMARATE 400 MG/1
TABLET, FILM COATED ORAL
Qty: 30 TABLET | Refills: 3 | Status: SHIPPED | OUTPATIENT
Start: 2023-01-24
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
## 2023-05-25 ENCOUNTER — OFFICE VISIT (OUTPATIENT)
Dept: PSYCHIATRY | Facility: CLINIC | Age: 33
End: 2023-05-25
Payer: MEDICARE

## 2023-05-25 VITALS
BODY MASS INDEX: 27.04 KG/M2 | TEMPERATURE: 98.7 F | HEART RATE: 122 BPM | HEIGHT: 64 IN | WEIGHT: 158.4 LBS | SYSTOLIC BLOOD PRESSURE: 128 MMHG | DIASTOLIC BLOOD PRESSURE: 74 MMHG | OXYGEN SATURATION: 99 %

## 2023-05-25 DIAGNOSIS — F79 INTELLECTUAL DISABILITY: Primary | ICD-10-CM

## 2023-05-25 DIAGNOSIS — F63.81 INTERMITTENT EXPLOSIVE DISORDER IN ADULT: ICD-10-CM

## 2023-05-25 DIAGNOSIS — F42.2 MIXED OBSESSIONAL THOUGHTS AND ACTS: ICD-10-CM

## 2023-05-25 DIAGNOSIS — Z79.899 LONG-TERM USE OF HIGH-RISK MEDICATION: ICD-10-CM

## 2023-05-25 PROCEDURE — 1159F MED LIST DOCD IN RCRD: CPT | Performed by: NURSE PRACTITIONER

## 2023-05-25 PROCEDURE — 99214 OFFICE O/P EST MOD 30 MIN: CPT | Performed by: NURSE PRACTITIONER

## 2023-05-25 PROCEDURE — 1160F RVW MEDS BY RX/DR IN RCRD: CPT | Performed by: NURSE PRACTITIONER

## 2023-05-25 RX ORDER — QUETIAPINE FUMARATE 100 MG/1
TABLET, FILM COATED ORAL
Qty: 60 TABLET | Refills: 1 | Status: SHIPPED | OUTPATIENT
Start: 2023-05-25

## 2023-05-25 RX ORDER — LAMOTRIGINE 100 MG/1
100 TABLET ORAL 2 TIMES DAILY
Qty: 60 TABLET | Refills: 1 | Status: SHIPPED | OUTPATIENT
Start: 2023-05-25

## 2023-05-25 RX ORDER — QUETIAPINE FUMARATE 300 MG/1
TABLET, FILM COATED ORAL
Qty: 30 TABLET | Refills: 1 | Status: SHIPPED | OUTPATIENT
Start: 2023-05-25

## 2023-05-25 RX ORDER — DULOXETIN HYDROCHLORIDE 60 MG/1
CAPSULE, DELAYED RELEASE ORAL
Qty: 60 CAPSULE | Refills: 1 | Status: SHIPPED | OUTPATIENT
Start: 2023-05-25

## 2023-05-25 RX ORDER — CLONAZEPAM 0.5 MG/1
0.5 TABLET ORAL 2 TIMES DAILY
Qty: 60 TABLET | Refills: 1 | Status: SHIPPED | OUTPATIENT
Start: 2023-05-25

## 2023-05-25 NOTE — PROGRESS NOTES
"      Kymberly Torres is a 33 y.o. male is here today for medication management follow-up. Presents with [facility] staff member.      Chief Complaint:  Recheck on behaviors and hallucinations    History of Present Illness:     Presents with caretaker Enoch.  He has pt most of time.  Pt does go with other staff on the weekends.  Enoch says pt behaves well for him.  Says that patient will occasionally get upset and will act like he is going to rip his shirt off.  He states he has learned with the patient if he does not raise his voice or seem agitated the patient responds better when he redirects him.  States he is sleeping all the time.  Sleeping all day and describes it as \"hard sleeping\" where he is snoring and not just on and off napping.  He states he goes to sleep fine at night without any difficulty so pretty much she is sleeping all the time.  He has not noticed any negative side effects to the meds such as tremors.  He continues to have obsessional thinking and states when he especially talks about his mother he will obsess and ask questions over and over again and will obsess on where they are going and constantly ask questions regarding that type of thing.Body mass index is 27.18 kg/m².  No appetite changes.  No acute medical stressors.      The following portions of the patient's history were reviewed and updated as appropriate: allergies, current medications, past family history, past medical history, past social history, past surgical history and problem list.    Review of Systems   Constitutional: Negative for activity change, appetite change and fatigue.   HENT: Negative.    Eyes: Negative for visual disturbance.   Respiratory: Negative.    Cardiovascular: Negative.    Gastrointestinal: Negative for nausea.   Endocrine: Negative.    Genitourinary: Negative.    Musculoskeletal: Negative for arthralgias.   Skin: Negative.    Allergic/Immunologic: Negative.    Neurological: Negative for " "dizziness, seizures and headaches.   Hematological: Negative.    Psychiatric/Behavioral: Positive for behavioral problems. Negative for agitation, confusion, decreased concentration, dysphoric mood, hallucinations, self-injury, sleep disturbance and suicidal ideas. The patient is not nervous/anxious and is not hyperactive.    Objective   Physical Exam  Vitals reviewed.   Constitutional:       Appearance: He is normal weight.   Musculoskeletal:      Cervical back: Normal range of motion and neck supple.   Neurological:      Mental Status: He is alert.   Psychiatric:         Attention and Perception: He is inattentive.         Mood and Affect: Mood is not anxious. Affect is blunt. Affect is not angry.         Behavior: Behavior is not agitated, aggressive or combative. Behavior is cooperative.         Cognition and Memory: Cognition is impaired.         Judgment: Judgment is impulsive.      Comments: Today he has eye contact with me some.  He nods his head yes to some questions.  His speech is difficult to understand.  He is cooporative       Blood pressure 128/74, pulse (!) 122, temperature 98.7 °F (37.1 °C), height 162.6 cm (64.02\"), weight 71.8 kg (158 lb 6.4 oz), SpO2 99 %.    Medication List:   Current Outpatient Medications   Medication Sig Dispense Refill   • clonazePAM (KlonoPIN) 0.5 MG tablet Take 1 tablet by mouth 2 (Two) Times a Day. 60 tablet 1   • DULoxetine (CYMBALTA) 60 MG capsule 2 po daily 60 capsule 1   • lamoTRIgine (LaMICtal) 100 MG tablet Take 1 tablet by mouth 2 (Two) Times a Day. 60 tablet 1   • QUEtiapine (SEROquel) 100 MG tablet One in the morning and one in the afternoon 60 tablet 1   • QUEtiapine (SEROquel) 300 MG tablet TAKE 1 TABLET BY MOUTH AT BEDTIME 30 tablet 1   • benzoyl peroxide 5 % external wash Apply 148 doses topically to the appropriate area as directed 2 (two) times a day.     • carbamide peroxide (DEBROX) 6.5 % otic solution Administer 5 drops into ear(s) as directed by " provider.     • cholecalciferol (VITAMIN D3) 10 MCG (400 UNIT) tablet Take 400 Units by mouth.     • docusate sodium 250 MG capsule Take  by mouth.     • gabapentin (NEURONTIN) 300 MG capsule Take 1 capsule by mouth 4 (Four) Times a Day. One in the AM and 2 in the PM     • levothyroxine (SYNTHROID, LEVOTHROID) 150 MCG tablet Take 150 mcg by mouth Daily.     • Linzess 145 MCG capsule capsule Take 145 mcg by mouth Daily.     • tretinoin (RETIN-A) 0.1 % cream APPLY PEA-SIZED AMOUNT TO THE AFFECTED AREA AT BEDTIME MIX WITH CLINDAMYCIN IN MORNINGS       No current facility-administered medications for this visit.     Reviewed copied data and there are no changes    Mental Status Exam:   Hygiene:   fair  Cooperation:  Cooperative  Eye Contact:  Poor  Psychomotor Behavior:  Restless  Affect:  Blunted  Hopelessness: Denies  Speech:  difficult to understand  Thought Process:  Unable to demonstrate  Thought Content:  Unable to demonstrate  Suicidal:  None  Homicidal:  None  Hallucinations:  Not demonstrated today  Delusion:  Unable to demonstrate  Memory:  Unable to evaluate  Orientation:  Unable to evaluate  Reliability:  poor  Insight:  Poor  Judgement:  Poor  Impulse Control:  Poor  Physical/Medical Issues:  Yes hypothyroidism    Assessment & Plan   Problems Addressed this Visit        Neuro    Intellectual disability - Primary   Other Visit Diagnoses     Intermittent explosive disorder in adult        Relevant Medications    QUEtiapine (SEROquel) 300 MG tablet    QUEtiapine (SEROquel) 100 MG tablet    lamoTRIgine (LaMICtal) 100 MG tablet    DULoxetine (CYMBALTA) 60 MG capsule    clonazePAM (KlonoPIN) 0.5 MG tablet    Long-term use of high-risk medication        Mixed obsessional thoughts and acts        Relevant Medications    DULoxetine (CYMBALTA) 60 MG capsule      Diagnoses       Codes Comments    Intellectual disability    -  Primary ICD-10-CM: F79  ICD-9-CM: 319     Intermittent explosive disorder in adult      ICD-10-CM: F63.81  ICD-9-CM: 312.34     Long-term use of high-risk medication     ICD-10-CM: Z79.899  ICD-9-CM: V58.69     Mixed obsessional thoughts and acts     ICD-10-CM: F42.2  ICD-9-CM: 300.3           Functionality: pt having significant impairment in important areas of daily functioning.  Prognosis: Guarded dependent on medication/follow up and treatment plan compliance.    I am going to decrease his Seroquel slightly as he may not be needing this much Seroquel in his new environment where boundaries have been set and his behaviors are seemingly better.  To decrease it to 100 mg twice a day during the day and decrease it slightly to 300 mg at night to see if this helps with the daytime sleepiness.  Can also always decrease Klonopin milligrams if necessary.  Going to see what decreasing the antipsychotic does as far as benefit. Continue the  the Klonopin for the intermittent explosive disorder as well as the Cymbalta and the Lamictal.  Refills have been submitted.  I did call pt's mom and discussed the plan of care and she is in agreement.   Should patient have worsening behaviors as far as placing himself or others in danger they are to transport him to the emergency room for evaluation. rtc 8 weeks.  Sooner if needed.  He has had recent lab work at his new PCP.  Enoch says he will get results and fax them to me.               This document has been electronically signed by TAHIR Galloway on   May 25, 2023 14:08 EDT.

## 2023-05-31 ENCOUNTER — TELEPHONE (OUTPATIENT)
Dept: FAMILY MEDICINE CLINIC | Facility: CLINIC | Age: 33
End: 2023-05-31

## 2023-05-31 NOTE — TELEPHONE ENCOUNTER
Elizabeth called and didn't realize his Seroquel was lowered 300mg a day.  He was very fidgety last night and she is concerned about him.. She states other wise his behavior is good

## 2023-06-01 DIAGNOSIS — F63.81 INTERMITTENT EXPLOSIVE DISORDER IN ADULT: ICD-10-CM

## 2023-06-01 RX ORDER — QUETIAPINE FUMARATE 400 MG/1
400 TABLET, FILM COATED ORAL NIGHTLY
Qty: 30 TABLET | Refills: 1 | Status: SHIPPED | OUTPATIENT
Start: 2023-06-01

## 2023-06-01 RX ORDER — QUETIAPINE FUMARATE 200 MG/1
TABLET, FILM COATED ORAL
Qty: 60 TABLET | Refills: 1 | Status: SHIPPED | OUTPATIENT
Start: 2023-06-01

## 2023-06-01 NOTE — PROGRESS NOTES
"Patient's mom had called and left a message stating that patient was a little more agitated on the decreased dosage of Seroquel and she wanted it increased back up.  I called the caretaker and he states that patient is more alert on the lower dosage she is a little more \"fidgety\".  And he states the mom is afraid this is going to lead to a bad episode so I am just going to go ahead and increase the medicine back up to what he was originally on which was Seroquel 200 mg twice daily and 400 mg at at bedtime.  Refill has been submitted.  I will have staff notify his mom.  Caretaker states that he will notify if any problems arise.  "
I have personally seen and examined this patient with the resident/fellow.  I have fully participated in the care of this patient. I have reviewed all pertinent clinical information, including history, physical exam, plan and the Resident/Fellow’s note and agree except as noted. See MDM

## 2023-06-01 NOTE — TELEPHONE ENCOUNTER
Elizabeth called and he is not doing well since you decreased his medications.  She would like it switched it back.  He is edgy and very fidgety    Please advise

## 2023-08-03 ENCOUNTER — OFFICE VISIT (OUTPATIENT)
Dept: PSYCHIATRY | Facility: CLINIC | Age: 33
End: 2023-08-03
Payer: MEDICARE

## 2023-08-03 VITALS
HEART RATE: 123 BPM | TEMPERATURE: 98.4 F | BODY MASS INDEX: 26.32 KG/M2 | HEIGHT: 64 IN | SYSTOLIC BLOOD PRESSURE: 134 MMHG | DIASTOLIC BLOOD PRESSURE: 97 MMHG | OXYGEN SATURATION: 99 % | WEIGHT: 154.2 LBS

## 2023-08-03 DIAGNOSIS — F42.2 MIXED OBSESSIONAL THOUGHTS AND ACTS: ICD-10-CM

## 2023-08-03 DIAGNOSIS — F63.81 INTERMITTENT EXPLOSIVE DISORDER IN ADULT: ICD-10-CM

## 2023-08-03 PROCEDURE — 1160F RVW MEDS BY RX/DR IN RCRD: CPT | Performed by: NURSE PRACTITIONER

## 2023-08-03 PROCEDURE — 1159F MED LIST DOCD IN RCRD: CPT | Performed by: NURSE PRACTITIONER

## 2023-08-03 PROCEDURE — 99214 OFFICE O/P EST MOD 30 MIN: CPT | Performed by: NURSE PRACTITIONER

## 2023-08-03 RX ORDER — QUETIAPINE FUMARATE 200 MG/1
TABLET, FILM COATED ORAL
Qty: 60 TABLET | Refills: 5 | Status: SHIPPED | OUTPATIENT
Start: 2023-08-03

## 2023-08-03 RX ORDER — LAMOTRIGINE 100 MG/1
100 TABLET ORAL 2 TIMES DAILY
Qty: 60 TABLET | Refills: 5 | Status: SHIPPED | OUTPATIENT
Start: 2023-08-03

## 2023-08-03 RX ORDER — CLONAZEPAM 0.5 MG/1
0.5 TABLET ORAL 2 TIMES DAILY
Qty: 60 TABLET | Refills: 5 | Status: SHIPPED | OUTPATIENT
Start: 2023-08-03

## 2023-08-03 RX ORDER — DULOXETIN HYDROCHLORIDE 60 MG/1
CAPSULE, DELAYED RELEASE ORAL
Qty: 60 CAPSULE | Refills: 5 | Status: SHIPPED | OUTPATIENT
Start: 2023-08-03

## 2023-08-03 RX ORDER — QUETIAPINE FUMARATE 400 MG/1
400 TABLET, FILM COATED ORAL NIGHTLY
Qty: 30 TABLET | Refills: 5 | Status: SHIPPED | OUTPATIENT
Start: 2023-08-03

## 2023-08-04 DIAGNOSIS — F63.81 INTERMITTENT EXPLOSIVE DISORDER IN ADULT: ICD-10-CM

## 2023-08-04 RX ORDER — CLONAZEPAM 0.5 MG/1
TABLET ORAL
Qty: 60 TABLET | OUTPATIENT
Start: 2023-08-04

## 2024-02-29 DIAGNOSIS — F63.81 INTERMITTENT EXPLOSIVE DISORDER IN ADULT: ICD-10-CM

## 2024-02-29 RX ORDER — CLONAZEPAM 0.5 MG/1
0.5 TABLET ORAL 2 TIMES DAILY
Qty: 60 TABLET | Refills: 0 | Status: SHIPPED | OUTPATIENT
Start: 2024-02-29

## 2024-03-28 ENCOUNTER — OFFICE VISIT (OUTPATIENT)
Dept: PSYCHIATRY | Facility: CLINIC | Age: 34
End: 2024-03-28
Payer: MEDICARE

## 2024-03-28 VITALS
HEART RATE: 116 BPM | HEIGHT: 64 IN | BODY MASS INDEX: 26.67 KG/M2 | WEIGHT: 156.2 LBS | TEMPERATURE: 98 F | OXYGEN SATURATION: 98 % | SYSTOLIC BLOOD PRESSURE: 148 MMHG | DIASTOLIC BLOOD PRESSURE: 96 MMHG

## 2024-03-28 DIAGNOSIS — F79 INTELLECTUAL DISABILITY: Primary | ICD-10-CM

## 2024-03-28 DIAGNOSIS — F63.81 INTERMITTENT EXPLOSIVE DISORDER IN ADULT: ICD-10-CM

## 2024-03-28 DIAGNOSIS — F42.2 MIXED OBSESSIONAL THOUGHTS AND ACTS: ICD-10-CM

## 2024-03-28 PROCEDURE — 1160F RVW MEDS BY RX/DR IN RCRD: CPT | Performed by: NURSE PRACTITIONER

## 2024-03-28 PROCEDURE — 1159F MED LIST DOCD IN RCRD: CPT | Performed by: NURSE PRACTITIONER

## 2024-03-28 PROCEDURE — 99214 OFFICE O/P EST MOD 30 MIN: CPT | Performed by: NURSE PRACTITIONER

## 2024-03-28 RX ORDER — QUETIAPINE FUMARATE 400 MG/1
400 TABLET, FILM COATED ORAL NIGHTLY
Qty: 30 TABLET | Refills: 2 | Status: SHIPPED | OUTPATIENT
Start: 2024-03-28

## 2024-03-28 RX ORDER — CLONAZEPAM 0.5 MG/1
0.25 TABLET ORAL 2 TIMES DAILY
Qty: 30 TABLET | Refills: 2 | Status: SHIPPED | OUTPATIENT
Start: 2024-03-28

## 2024-03-28 RX ORDER — LAMOTRIGINE 100 MG/1
100 TABLET ORAL 2 TIMES DAILY
Qty: 60 TABLET | Refills: 2 | Status: SHIPPED | OUTPATIENT
Start: 2024-03-28

## 2024-03-28 RX ORDER — QUETIAPINE FUMARATE 200 MG/1
TABLET, FILM COATED ORAL
Qty: 60 TABLET | Refills: 2 | Status: SHIPPED | OUTPATIENT
Start: 2024-03-28

## 2024-03-28 RX ORDER — DULOXETIN HYDROCHLORIDE 60 MG/1
CAPSULE, DELAYED RELEASE ORAL
Qty: 60 CAPSULE | Refills: 2 | Status: SHIPPED | OUTPATIENT
Start: 2024-03-28

## 2024-03-28 NOTE — PROGRESS NOTES
Kymberly Torres is a 34 y.o. male is here today for medication management follow-up.     Chief Complaint:  Recheck on behaviors and hallucinations    History of Present Illness:     Presents with caretaker Enoch.  Enoch is the historian.  He states that patient continues to be at his current baseline.  He states patient really only has outbursts when he is not allowed to do something that he wants to do.  Also he states patient is sleeping all day and sleeps well all night.  He feels like patient gets upset when he has him go anywhere.  If he is not tired he does not act as bad.  He states that patient can read and understands what is going on as he caught patient reading his text recently as well.  He states for the most part patient is able to be redirected there has not been any assaults with staff.  No present negative side effects.  No noticeable tremors.  No acute medical stressors.Body mass index is 26.79 kg/m².  No significant changes in weight.          The following portions of the patient's history were reviewed and updated as appropriate: allergies, current medications, past family history, past medical history, past social history, past surgical history and problem list.    Review of Systems   Constitutional:  Negative for activity change, appetite change and fatigue.   HENT: Negative.     Eyes:  Negative for visual disturbance.   Respiratory: Negative.     Cardiovascular: Negative.    Gastrointestinal:  Negative for nausea.   Endocrine: Negative.    Genitourinary: Negative.    Musculoskeletal:  Negative for arthralgias.   Skin: Negative.    Allergic/Immunologic: Negative.    Neurological:  Negative for dizziness, seizures and headaches.   Hematological: Negative.    Psychiatric/Behavioral:  Positive for behavioral problems. Negative for agitation, confusion, decreased concentration, dysphoric mood, hallucinations, self-injury, sleep disturbance and suicidal ideas. The patient is not  "nervous/anxious and is not hyperactive.    Objective     Reviewed copied data and there are no changes    Physical Exam  Vitals reviewed.   Constitutional:       Appearance: He is normal weight.   Musculoskeletal:      Cervical back: Normal range of motion and neck supple.   Neurological:      Mental Status: He is alert.   Psychiatric:         Attention and Perception: He is inattentive.         Mood and Affect: Mood is not anxious. Affect is blunt. Affect is not angry.         Behavior: Behavior is not agitated, aggressive or combative. Behavior is cooperative.         Cognition and Memory: Cognition is impaired.         Judgment: Judgment is impulsive.      Comments: Today he has eye contact with me some.  He nods his head yes to some questions.  His speech is difficult to understand.  He is cooporative       Blood pressure 148/96, pulse 116, temperature 98 °F (36.7 °C), temperature source Temporal, height 162.6 cm (64.02\"), weight 70.9 kg (156 lb 3.2 oz), SpO2 98%.    Medication List:   Current Outpatient Medications   Medication Sig Dispense Refill    clonazePAM (KlonoPIN) 0.5 MG tablet Take 0.5 tablets by mouth 2 (Two) Times a Day. 30 tablet 2    DULoxetine (CYMBALTA) 60 MG capsule 2 po daily 60 capsule 2    lamoTRIgine (LaMICtal) 100 MG tablet Take 1 tablet by mouth 2 (Two) Times a Day. 60 tablet 2    QUEtiapine (SEROquel) 200 MG tablet One in the morning and one in the afternoon 60 tablet 2    QUEtiapine (SEROquel) 400 MG tablet Take 1 tablet by mouth Every Night. TAKE 1 TABLET BY MOUTH AT BEDTIME 30 tablet 2    benzoyl peroxide 5 % external wash Apply 148 doses topically to the appropriate area as directed 2 (two) times a day.      carbamide peroxide (DEBROX) 6.5 % otic solution Administer 5 drops into ear(s) as directed by provider.      cholecalciferol (VITAMIN D3) 10 MCG (400 UNIT) tablet Take 400 Units by mouth.      docusate sodium 250 MG capsule Take  by mouth.      gabapentin (NEURONTIN) 300 MG " capsule Take 1 capsule by mouth 4 (Four) Times a Day. One in the AM and 2 in the PM      levothyroxine (SYNTHROID, LEVOTHROID) 150 MCG tablet Take 150 mcg by mouth Daily.      Linzess 145 MCG capsule capsule Take 145 mcg by mouth Daily.      tretinoin (RETIN-A) 0.1 % cream APPLY PEA-SIZED AMOUNT TO THE AFFECTED AREA AT BEDTIME MIX WITH CLINDAMYCIN IN MORNINGS       No current facility-administered medications for this visit.     Reviewed copied data and there are no changes    Mental Status Exam:   Hygiene:   fair  Cooperation:  Cooperative  Eye Contact:  Poor  Psychomotor Behavior:  Restless  Affect:  Blunted  Hopelessness: Denies  Speech:   difficult to understand  Thought Process:  Unable to demonstrate  Thought Content:  Unable to demonstrate  Suicidal:  None  Homicidal:  None  Hallucinations:  Not demonstrated today  Delusion:  Unable to demonstrate  Memory:  Unable to evaluate  Orientation:  Unable to evaluate  Reliability:  poor  Insight:  Poor  Judgement:  Poor  Impulse Control:  Poor  Physical/Medical Issues:  Yes hypothyroidism    Assessment & Plan   Problems Addressed this Visit          Neuro    Intellectual disability - Primary     Other Visit Diagnoses       Intermittent explosive disorder in adult        Relevant Medications    clonazePAM (KlonoPIN) 0.5 MG tablet    DULoxetine (CYMBALTA) 60 MG capsule    lamoTRIgine (LaMICtal) 100 MG tablet    QUEtiapine (SEROquel) 200 MG tablet    QUEtiapine (SEROquel) 400 MG tablet    Mixed obsessional thoughts and acts        Relevant Medications    DULoxetine (CYMBALTA) 60 MG capsule          Diagnoses         Codes Comments    Intellectual disability    -  Primary ICD-10-CM: F79  ICD-9-CM: 319     Intermittent explosive disorder in adult     ICD-10-CM: F63.81  ICD-9-CM: 312.34     Mixed obsessional thoughts and acts     ICD-10-CM: F42.2  ICD-9-CM: 300.3             Functionality: pt having significant impairment in important areas of daily  functioning.  Prognosis: Guarded dependent on medication/follow up and treatment plan compliance.  Champ reviewed.  Patient has Uds in system reviewed.      Decreasing his klonopin slightly to half of 0.5 BID with the excessive sleeping all day.  He is prescribed Neurontin by another provider.  Trying to get him to not be sleeping all the time.  He has upcoming lab work scheduled with his PCP and I will be able to view those results    Pt seems at his baseline behavior.  He will continue cymbalta for the obsessional thoughts, continue the Seroquel for the intermittent explosive disorder, continue Lamictal for intermittent explosive disorder, continue Klonopin for the intermittent explosive disorder.  Refills of all been submitted.RTC 3 months.  Sooner if needed.   Caregiver will notify me should any problems develop and I can see him sooner.               This document has been electronically signed by TAHIR Galloway on   March 28, 2024 12:47 EDT.

## 2024-05-13 DIAGNOSIS — F63.81 INTERMITTENT EXPLOSIVE DISORDER IN ADULT: ICD-10-CM

## 2024-05-13 RX ORDER — CLONAZEPAM 0.5 MG/1
0.5 TABLET ORAL 2 TIMES DAILY
Qty: 60 TABLET | Refills: 0 | OUTPATIENT
Start: 2024-05-13

## 2024-06-03 DIAGNOSIS — F42.2 MIXED OBSESSIONAL THOUGHTS AND ACTS: ICD-10-CM

## 2024-06-03 DIAGNOSIS — F63.81 INTERMITTENT EXPLOSIVE DISORDER IN ADULT: ICD-10-CM

## 2024-06-03 RX ORDER — QUETIAPINE FUMARATE 200 MG/1
TABLET, FILM COATED ORAL
Qty: 60 TABLET | Refills: 2 | OUTPATIENT
Start: 2024-06-03

## 2024-06-03 RX ORDER — LAMOTRIGINE 100 MG/1
100 TABLET ORAL 2 TIMES DAILY
Qty: 60 TABLET | Refills: 2 | OUTPATIENT
Start: 2024-06-03

## 2024-06-03 RX ORDER — DULOXETIN HYDROCHLORIDE 60 MG/1
CAPSULE, DELAYED RELEASE ORAL
Qty: 60 CAPSULE | Refills: 2 | OUTPATIENT
Start: 2024-06-03

## 2024-06-20 ENCOUNTER — OFFICE VISIT (OUTPATIENT)
Dept: PSYCHIATRY | Facility: CLINIC | Age: 34
End: 2024-06-20
Payer: MEDICARE

## 2024-06-20 VITALS
WEIGHT: 157 LBS | OXYGEN SATURATION: 95 % | DIASTOLIC BLOOD PRESSURE: 100 MMHG | HEIGHT: 64 IN | SYSTOLIC BLOOD PRESSURE: 143 MMHG | TEMPERATURE: 98.7 F | BODY MASS INDEX: 26.8 KG/M2 | HEART RATE: 121 BPM

## 2024-06-20 DIAGNOSIS — F63.81 INTERMITTENT EXPLOSIVE DISORDER IN ADULT: ICD-10-CM

## 2024-06-20 DIAGNOSIS — F42.2 MIXED OBSESSIONAL THOUGHTS AND ACTS: ICD-10-CM

## 2024-06-20 PROCEDURE — 1160F RVW MEDS BY RX/DR IN RCRD: CPT | Performed by: NURSE PRACTITIONER

## 2024-06-20 PROCEDURE — 99214 OFFICE O/P EST MOD 30 MIN: CPT | Performed by: NURSE PRACTITIONER

## 2024-06-20 PROCEDURE — 1159F MED LIST DOCD IN RCRD: CPT | Performed by: NURSE PRACTITIONER

## 2024-06-20 RX ORDER — QUETIAPINE FUMARATE 400 MG/1
400 TABLET, FILM COATED ORAL NIGHTLY
Qty: 30 TABLET | Refills: 2 | Status: SHIPPED | OUTPATIENT
Start: 2024-06-20

## 2024-06-20 RX ORDER — LAMOTRIGINE 100 MG/1
100 TABLET ORAL 2 TIMES DAILY
Qty: 60 TABLET | Refills: 2 | Status: SHIPPED | OUTPATIENT
Start: 2024-06-20

## 2024-06-20 RX ORDER — CLONAZEPAM 0.5 MG/1
0.25 TABLET ORAL 2 TIMES DAILY
Qty: 30 TABLET | Refills: 2 | Status: SHIPPED | OUTPATIENT
Start: 2024-06-20 | End: 2024-06-20 | Stop reason: SDUPTHER

## 2024-06-20 RX ORDER — QUETIAPINE FUMARATE 200 MG/1
TABLET, FILM COATED ORAL
Qty: 60 TABLET | Refills: 2 | Status: SHIPPED | OUTPATIENT
Start: 2024-06-20

## 2024-06-20 RX ORDER — CLONAZEPAM 0.5 MG/1
0.5 TABLET ORAL 2 TIMES DAILY
Qty: 60 TABLET | Refills: 2 | Status: SHIPPED | OUTPATIENT
Start: 2024-06-20

## 2024-06-20 RX ORDER — DULOXETIN HYDROCHLORIDE 60 MG/1
CAPSULE, DELAYED RELEASE ORAL
Qty: 60 CAPSULE | Refills: 2 | Status: SHIPPED | OUTPATIENT
Start: 2024-06-20

## 2024-06-20 NOTE — PROGRESS NOTES
Kymberly Torres is a 34 y.o. male is here today for medication management follow-up.     Chief Complaint:  Recheck on behaviors and hallucinations    History of Present Illness:     Presents with caretaker Enoch.  Enoch is the historian.  He states that patient continues to be at his current baseline.  He has had couple of outbursts in the last month and had destruction of property.  Enoch states he eventually calmed down.  No certain trigger.  He states he takes patient out and pt hates to leave the house and pt will act up sometimes.  Pt continues to obsess on some things and fixate on various things.  Pt is sleeping all night for the most part.  No medical stressors.  Body mass index is 26.94 kg/m². No apparent negative side effects to the meds.  Upon review of patient's medications and alissa it was found pts klonopin dosage from last visit was not filled the pharmacy has been continuing the same previous dosage from earlier prescriptions.  Enoch states pt has been more alert where as he was sleeping more previously hence I had decreased the klonopin slightly.                The following portions of the patient's history were reviewed and updated as appropriate: allergies, current medications, past family history, past medical history, past social history, past surgical history and problem list.    Review of Systems   Constitutional:  Negative for activity change, appetite change and fatigue.   HENT: Negative.     Eyes:  Negative for visual disturbance.   Respiratory: Negative.     Cardiovascular: Negative.    Gastrointestinal:  Negative for nausea.   Endocrine: Negative.    Genitourinary: Negative.    Musculoskeletal:  Negative for arthralgias.   Skin: Negative.    Allergic/Immunologic: Negative.    Neurological:  Negative for dizziness, seizures and headaches.   Hematological: Negative.    Psychiatric/Behavioral:  Positive for behavioral problems. Negative for agitation, confusion,  "decreased concentration, dysphoric mood, hallucinations, self-injury, sleep disturbance and suicidal ideas. The patient is not nervous/anxious and is not hyperactive.    Objective       Physical Exam  Vitals reviewed.   Constitutional:       Appearance: He is normal weight.   Musculoskeletal:      Cervical back: Normal range of motion and neck supple.   Neurological:      Mental Status: He is alert.   Psychiatric:         Attention and Perception: He is inattentive.         Mood and Affect: Mood is not anxious. Affect is blunt. Affect is not angry.         Behavior: Behavior is not agitated, aggressive or combative. Behavior is cooperative.         Cognition and Memory: Cognition is impaired.         Judgment: Judgment is impulsive.      Comments: Today pt came in holding his head some and saying he is tired.  Poor eye contact.  More restless but able to be redirected.         Blood pressure 143/100, pulse (!) 121, temperature 98.7 °F (37.1 °C), height 162.6 cm (64.02\"), weight 71.2 kg (157 lb), SpO2 95%.    Medication List:   Current Outpatient Medications   Medication Sig Dispense Refill    clonazePAM (KlonoPIN) 0.5 MG tablet Take 1 tablet by mouth 2 (Two) Times a Day. 60 tablet 2    DULoxetine (CYMBALTA) 60 MG capsule 2 po daily 60 capsule 2    lamoTRIgine (LaMICtal) 100 MG tablet Take 1 tablet by mouth 2 (Two) Times a Day. 60 tablet 2    QUEtiapine (SEROquel) 200 MG tablet One in the morning and one in the afternoon 60 tablet 2    QUEtiapine (SEROquel) 400 MG tablet Take 1 tablet by mouth Every Night. 30 tablet 2    benzoyl peroxide 5 % external wash Apply 148 doses topically to the appropriate area as directed 2 (two) times a day.      carbamide peroxide (DEBROX) 6.5 % otic solution Administer 5 drops into ear(s) as directed by provider.      cholecalciferol (VITAMIN D3) 10 MCG (400 UNIT) tablet Take 400 Units by mouth.      docusate sodium 250 MG capsule Take  by mouth.      gabapentin (NEURONTIN) 300 MG " capsule Take 1 capsule by mouth 4 (Four) Times a Day. One in the AM and 2 in the PM      levothyroxine (SYNTHROID, LEVOTHROID) 150 MCG tablet Take 150 mcg by mouth Daily.      Linzess 145 MCG capsule capsule Take 145 mcg by mouth Daily.      tretinoin (RETIN-A) 0.1 % cream APPLY PEA-SIZED AMOUNT TO THE AFFECTED AREA AT BEDTIME MIX WITH CLINDAMYCIN IN MORNINGS       No current facility-administered medications for this visit.     Reviewed copied data and there are no changes    Mental Status Exam:   Hygiene:   fair  Cooperation:  Cooperative  Eye Contact:  Poor  Psychomotor Behavior:  Restless  Affect:  Blunted  Hopelessness: Denies  Speech:   difficult to understand  Thought Process:  Unable to demonstrate  Thought Content:  Unable to demonstrate  Suicidal:  None  Homicidal:  None  Hallucinations:  Not demonstrated today  Delusion:  Unable to demonstrate  Memory:  Unable to evaluate  Orientation:  Unable to evaluate  Reliability:  poor  Insight:  Poor  Judgement:  Poor  Impulse Control:  Poor  Physical/Medical Issues:  Yes hypothyroidism    Assessment & Plan   Problems Addressed this Visit    None  Visit Diagnoses       Intermittent explosive disorder in adult        Relevant Medications    DULoxetine (CYMBALTA) 60 MG capsule    lamoTRIgine (LaMICtal) 100 MG tablet    QUEtiapine (SEROquel) 200 MG tablet    QUEtiapine (SEROquel) 400 MG tablet    clonazePAM (KlonoPIN) 0.5 MG tablet    Mixed obsessional thoughts and acts        Relevant Medications    DULoxetine (CYMBALTA) 60 MG capsule          Diagnoses         Codes Comments    Intermittent explosive disorder in adult     ICD-10-CM: F63.81  ICD-9-CM: 312.34     Mixed obsessional thoughts and acts     ICD-10-CM: F42.2  ICD-9-CM: 300.3             Functionality: pt having significant impairment in important areas of daily functioning.  Prognosis: Guarded dependent on medication/follow up and treatment plan compliance.  Champ reviewed.  Patient has Uds in system  reviewed.    Since patient's Klonopin dosage had really not changed I am continue him on what he has been taking which is 0.5 twice daily.      Pt seems at his baseline behavior.  He will continue cymbalta for the obsessional thoughts, continue the Seroquel for the intermittent explosive disorder, continue Lamictal for intermittent explosive disorder, continue Klonopin for the intermittent explosive disorder.  Refills of all been submitted.RTC 3 months.  Sooner if needed.   Caregiver will notify me should any problems develop and I can see him sooner.  Will get UDS on his next visit.  Patient was very restless today so I will get this at the next visit               This document has been electronically signed by TAHIR Galloway on   June 20, 2024 12:13 EDT.

## 2024-09-19 DIAGNOSIS — F63.81 INTERMITTENT EXPLOSIVE DISORDER IN ADULT: ICD-10-CM

## 2024-09-19 RX ORDER — CLONAZEPAM 0.5 MG/1
0.5 TABLET ORAL 2 TIMES DAILY
Qty: 60 TABLET | Refills: 2 | Status: SHIPPED | OUTPATIENT
Start: 2024-09-19

## 2024-10-22 DIAGNOSIS — F63.81 INTERMITTENT EXPLOSIVE DISORDER IN ADULT: ICD-10-CM

## 2024-10-22 DIAGNOSIS — F42.2 MIXED OBSESSIONAL THOUGHTS AND ACTS: ICD-10-CM

## 2024-10-22 RX ORDER — QUETIAPINE FUMARATE 400 MG/1
400 TABLET, FILM COATED ORAL NIGHTLY
Qty: 30 TABLET | Refills: 1 | Status: SHIPPED | OUTPATIENT
Start: 2024-10-22 | End: 2024-10-24 | Stop reason: SDUPTHER

## 2024-10-22 RX ORDER — LAMOTRIGINE 100 MG/1
100 TABLET ORAL 2 TIMES DAILY
Qty: 60 TABLET | Refills: 1 | Status: SHIPPED | OUTPATIENT
Start: 2024-10-22 | End: 2024-10-24 | Stop reason: SDUPTHER

## 2024-10-22 RX ORDER — DULOXETIN HYDROCHLORIDE 60 MG/1
CAPSULE, DELAYED RELEASE ORAL
Qty: 60 CAPSULE | Refills: 1 | Status: SHIPPED | OUTPATIENT
Start: 2024-10-22 | End: 2024-10-24 | Stop reason: SDUPTHER

## 2024-10-22 RX ORDER — QUETIAPINE FUMARATE 200 MG/1
TABLET, FILM COATED ORAL
Qty: 60 TABLET | Refills: 1 | Status: SHIPPED | OUTPATIENT
Start: 2024-10-22 | End: 2024-10-24 | Stop reason: SDUPTHER

## 2024-10-24 ENCOUNTER — OFFICE VISIT (OUTPATIENT)
Dept: PSYCHIATRY | Facility: CLINIC | Age: 34
End: 2024-10-24
Payer: MEDICARE

## 2024-10-24 VITALS
HEART RATE: 108 BPM | SYSTOLIC BLOOD PRESSURE: 138 MMHG | BODY MASS INDEX: 26.98 KG/M2 | DIASTOLIC BLOOD PRESSURE: 94 MMHG | HEIGHT: 64 IN | WEIGHT: 158 LBS | OXYGEN SATURATION: 99 %

## 2024-10-24 DIAGNOSIS — F42.2 MIXED OBSESSIONAL THOUGHTS AND ACTS: ICD-10-CM

## 2024-10-24 DIAGNOSIS — F63.81 INTERMITTENT EXPLOSIVE DISORDER IN ADULT: Primary | ICD-10-CM

## 2024-10-24 DIAGNOSIS — F79 INTELLECTUAL DISABILITY: ICD-10-CM

## 2024-10-24 PROCEDURE — 1160F RVW MEDS BY RX/DR IN RCRD: CPT | Performed by: NURSE PRACTITIONER

## 2024-10-24 PROCEDURE — 1159F MED LIST DOCD IN RCRD: CPT | Performed by: NURSE PRACTITIONER

## 2024-10-24 PROCEDURE — 99214 OFFICE O/P EST MOD 30 MIN: CPT | Performed by: NURSE PRACTITIONER

## 2024-10-24 RX ORDER — CLONAZEPAM 0.5 MG/1
0.5 TABLET ORAL 2 TIMES DAILY
Qty: 60 TABLET | Refills: 3 | Status: SHIPPED | OUTPATIENT
Start: 2024-10-24

## 2024-10-24 RX ORDER — QUETIAPINE FUMARATE 400 MG/1
400 TABLET, FILM COATED ORAL NIGHTLY
Qty: 30 TABLET | Refills: 3 | Status: SHIPPED | OUTPATIENT
Start: 2024-10-24

## 2024-10-24 RX ORDER — LAMOTRIGINE 100 MG/1
100 TABLET ORAL 2 TIMES DAILY
Qty: 60 TABLET | Refills: 3 | Status: SHIPPED | OUTPATIENT
Start: 2024-10-24

## 2024-10-24 RX ORDER — DULOXETIN HYDROCHLORIDE 60 MG/1
CAPSULE, DELAYED RELEASE ORAL
Qty: 60 CAPSULE | Refills: 3 | Status: SHIPPED | OUTPATIENT
Start: 2024-10-24

## 2024-10-24 RX ORDER — QUETIAPINE FUMARATE 200 MG/1
TABLET, FILM COATED ORAL
Qty: 60 TABLET | Refills: 3 | Status: SHIPPED | OUTPATIENT
Start: 2024-10-24

## 2025-02-27 ENCOUNTER — OFFICE VISIT (OUTPATIENT)
Dept: PSYCHIATRY | Facility: CLINIC | Age: 35
End: 2025-02-27
Payer: MEDICARE

## 2025-02-27 VITALS
SYSTOLIC BLOOD PRESSURE: 144 MMHG | HEART RATE: 111 BPM | WEIGHT: 161.8 LBS | BODY MASS INDEX: 27.62 KG/M2 | HEIGHT: 64 IN | DIASTOLIC BLOOD PRESSURE: 97 MMHG | OXYGEN SATURATION: 99 %

## 2025-02-27 DIAGNOSIS — F79 INTELLECTUAL DISABILITY: ICD-10-CM

## 2025-02-27 DIAGNOSIS — F63.81 INTERMITTENT EXPLOSIVE DISORDER IN ADULT: Primary | ICD-10-CM

## 2025-02-27 DIAGNOSIS — F42.2 MIXED OBSESSIONAL THOUGHTS AND ACTS: ICD-10-CM

## 2025-02-27 PROCEDURE — 1159F MED LIST DOCD IN RCRD: CPT | Performed by: NURSE PRACTITIONER

## 2025-02-27 PROCEDURE — 1160F RVW MEDS BY RX/DR IN RCRD: CPT | Performed by: NURSE PRACTITIONER

## 2025-02-27 PROCEDURE — 99214 OFFICE O/P EST MOD 30 MIN: CPT | Performed by: NURSE PRACTITIONER

## 2025-02-27 RX ORDER — CLONAZEPAM 0.5 MG/1
0.5 TABLET ORAL 2 TIMES DAILY
Qty: 60 TABLET | Refills: 3 | Status: SHIPPED | OUTPATIENT
Start: 2025-02-27

## 2025-02-27 RX ORDER — QUETIAPINE FUMARATE 200 MG/1
TABLET, FILM COATED ORAL
Qty: 60 TABLET | Refills: 3 | Status: SHIPPED | OUTPATIENT
Start: 2025-02-27

## 2025-02-27 RX ORDER — LAMOTRIGINE 100 MG/1
100 TABLET ORAL 2 TIMES DAILY
Qty: 60 TABLET | Refills: 3 | Status: SHIPPED | OUTPATIENT
Start: 2025-02-27

## 2025-02-27 RX ORDER — DULOXETIN HYDROCHLORIDE 60 MG/1
CAPSULE, DELAYED RELEASE ORAL
Qty: 60 CAPSULE | Refills: 3 | Status: SHIPPED | OUTPATIENT
Start: 2025-02-27

## 2025-02-27 RX ORDER — QUETIAPINE FUMARATE 400 MG/1
400 TABLET, FILM COATED ORAL NIGHTLY
Qty: 30 TABLET | Refills: 3 | Status: SHIPPED | OUTPATIENT
Start: 2025-02-27

## 2025-02-27 NOTE — PROGRESS NOTES
"      Kymberly Torres is a 35 y.o. male is here today for medication management follow-up. Patient's PCP is [facility] located in [facility]    Chief Complaint:  Recheck on behaviors     History of Present Illness:     Presents with caretaker Enoch.  Enoch is the historian.    History of Present Illness  The patient is a 35-year-old male who presents for evaluation of obsessive-compulsive disorder, seizure, and medication management.    History is reported by other person in the presence of the patient.  He has been experiencing significant distress due to his obsessive-compulsive disorder (OCD), characterized by repetitive questioning. This morning, he exhibited this behavior by asking the same question approximately 20 times within a 10-minute period. Despite attempts to manage this with increased medication, there has been no noticeable improvement. He has had one instance in which he acted as if he was going to \"come at\" his caregiver but he did not.  His sleep pattern has also changed, with less daytime sleep than usual. He had a seizure about 1.5 months ago, which was triggered by a silicone earbud that had been lodged in his ear. After the seizure, he was taken to the doctor for a checkup. The doctor found the earbud and removed it. He was prescribed an injectable medication to be administered rectally. His primary care physician is located in [place], where he had lab work done prior to his last visit. It is reported that his behavior changes when visiting the doctor, becoming more agitated and resistant. His appetite remains stable. He has not engaged in any destructive behavior or property damage. His sleep pattern is inconsistent, but not unusual for him. He does not have a roommate He has not been started on any new medications, except for Valium rectal gel for seizures. Body mass index is 27.76 kg/m². Weight gain 3 lbs since last visit.  No acute medical stressors.  No apparent " "negative side effects to the meds.          MEDICATIONS  Klonopin, Cymbalta, Lamictal, Seroquel, Valium rectal gel.             The following portions of the patient's history were reviewed and updated as appropriate: allergies, current medications, past family history, past medical history, past social history, past surgical history and problem list.    Review of Systems   Constitutional:  Negative for activity change, appetite change and fatigue.   HENT: Negative.     Eyes:  Negative for visual disturbance.   Respiratory: Negative.     Cardiovascular: Negative.    Gastrointestinal:  Negative for nausea.   Endocrine: Negative.    Genitourinary: Negative.    Musculoskeletal:  Negative for arthralgias.   Skin: Negative.    Allergic/Immunologic: Negative.    Neurological:  Negative for dizziness, seizures and headaches.   Hematological: Negative.    Psychiatric/Behavioral:  Positive for behavioral problems. Negative for agitation, confusion, decreased concentration, dysphoric mood, hallucinations, self-injury, sleep disturbance and suicidal ideas. The patient is not nervous/anxious and is not hyperactive.    Objective       Physical Exam  Vitals reviewed.   Constitutional:       Appearance: He is normal weight.   Musculoskeletal:      Cervical back: Normal range of motion and neck supple.   Neurological:      Mental Status: He is alert.   Psychiatric:         Attention and Perception: He is inattentive.         Mood and Affect: Mood is not anxious. Affect is blunt. Affect is not angry.         Behavior: Behavior is not agitated, aggressive or combative.         Cognition and Memory: Cognition is impaired.         Judgment: Judgment is impulsive.      Comments: Today pt was making some eye contact.  Sitting with arms crossed on couch.  Would not talk to me.          Blood pressure 144/97, pulse 111, height 162.6 cm (64.02\"), weight 73.4 kg (161 lb 12.8 oz), SpO2 99%.    Medication List:   Current Outpatient Medications "   Medication Sig Dispense Refill    clonazePAM (KlonoPIN) 0.5 MG tablet Take 1 tablet by mouth 2 (Two) Times a Day. 60 tablet 3    DULoxetine (CYMBALTA) 60 MG capsule 2 po daily 60 capsule 3    lamoTRIgine (LaMICtal) 100 MG tablet Take 1 tablet by mouth 2 (Two) Times a Day. 60 tablet 3    QUEtiapine (SEROquel) 200 MG tablet One in the morning and one in the afternoon 60 tablet 3    QUEtiapine (SEROquel) 400 MG tablet Take 1 tablet by mouth Every Night. 30 tablet 3    benzoyl peroxide 5 % external wash Apply 148 doses topically to the appropriate area as directed 2 (two) times a day.      carbamide peroxide (DEBROX) 6.5 % otic solution Administer 5 drops into ear(s) as directed by provider.      cholecalciferol (VITAMIN D3) 10 MCG (400 UNIT) tablet Take 400 Units by mouth.      docusate sodium 250 MG capsule Take  by mouth.      gabapentin (NEURONTIN) 300 MG capsule Take 1 capsule by mouth 4 (Four) Times a Day. One in the AM and 2 in the PM      levothyroxine (SYNTHROID, LEVOTHROID) 150 MCG tablet Take 150 mcg by mouth Daily.      Linzess 145 MCG capsule capsule Take 145 mcg by mouth Daily.      tretinoin (RETIN-A) 0.1 % cream APPLY PEA-SIZED AMOUNT TO THE AFFECTED AREA AT BEDTIME MIX WITH CLINDAMYCIN IN MORNINGS       No current facility-administered medications for this visit.     Reviewed copied data and there are no changes    Mental Status Exam:   Hygiene:   fair  Cooperation:  Cooperative  Eye Contact:  Poor  Psychomotor Behavior:  Restless  Affect:  Blunted  Hopelessness: Denies  Speech:   difficult to understand  Thought Process:  Unable to demonstrate  Thought Content:  Unable to demonstrate  Suicidal:  None  Homicidal:  None  Hallucinations:  Not demonstrated today  Delusion:  Unable to demonstrate  Memory:  Unable to evaluate  Orientation:  Unable to evaluate  Reliability:  poor  Insight:  Poor  Judgement:  Poor  Impulse Control:  Poor  Physical/Medical Issues:  Yes hypothyroidism    Assessment & Plan    Problems Addressed this Visit          Neuro    Intellectual disability     Other Visit Diagnoses       Intermittent explosive disorder in adult    -  Primary    Relevant Medications    clonazePAM (KlonoPIN) 0.5 MG tablet    DULoxetine (CYMBALTA) 60 MG capsule    lamoTRIgine (LaMICtal) 100 MG tablet    QUEtiapine (SEROquel) 200 MG tablet    QUEtiapine (SEROquel) 400 MG tablet    Mixed obsessional thoughts and acts        Relevant Medications    DULoxetine (CYMBALTA) 60 MG capsule          Diagnoses         Codes Comments    Intermittent explosive disorder in adult    -  Primary ICD-10-CM: F63.81  ICD-9-CM: 312.34     Mixed obsessional thoughts and acts     ICD-10-CM: F42.2  ICD-9-CM: 300.3     Intellectual disability     ICD-10-CM: F79  ICD-9-CM: 319             Functionality: pt having significant impairment in important areas of daily functioning.  Prognosis: Guarded dependent on medication/follow up and treatment plan compliance.  Champ reviewed.   Caregiver states pt had labwork at his PCPs recently and was told everything was normal.     Patient was unable to urinate for urine drug screen.  Due to his intellectual disability I do not feel an oral swab is needed as patient gets upset very easily and has to sit in the car awaiting appointment as he can get so easily agitated.  I feel it is more detrimental to try and push the pt to give a uds and he will not understand giving an oral swab.          Pt seems at his baseline behavior.  In the past when we have attempted to increase meds for obsessive like behavior it has caused worse agitation.  I do not feel a medication change is necessary today.  He will continue cymbalta for the obsessional thoughts, continue the Seroquel for the intermittent explosive disorder, continue Lamictal for intermittent explosive disorder, continue Klonopin for the intermittent explosive disorder.  Refills of all been submitted.RTC 4 months.  Sooner if needed.   Caregiver will  notify me should any problems develop and I can see him sooner.  Patient or patient representative verbalized consent for the use of Ambient Listening during the visit with  TAHIR Galloway for chart documentation. 2/27/2025  13:01 EST                This document has been electronically signed by TAHIR Galloway on   February 27, 2025 13:18 EST.

## 2025-05-08 DIAGNOSIS — F63.81 INTERMITTENT EXPLOSIVE DISORDER IN ADULT: ICD-10-CM

## 2025-05-08 RX ORDER — QUETIAPINE FUMARATE 400 MG/1
400 TABLET, FILM COATED ORAL NIGHTLY
Qty: 30 TABLET | Refills: 3 | OUTPATIENT
Start: 2025-05-08

## 2025-05-08 RX ORDER — CLONAZEPAM 0.5 MG/1
0.5 TABLET ORAL 2 TIMES DAILY
Qty: 60 TABLET | Refills: 3 | OUTPATIENT
Start: 2025-05-08

## 2025-05-08 RX ORDER — QUETIAPINE FUMARATE 200 MG/1
TABLET, FILM COATED ORAL
Qty: 60 TABLET | Refills: 3 | OUTPATIENT
Start: 2025-05-08

## 2025-05-08 NOTE — TELEPHONE ENCOUNTER
Spoke with pharmacy they do have refills but it is to soon to get filled.  Notified Brendan he verbalized understanding

## 2025-05-08 NOTE — TELEPHONE ENCOUNTER
Patients caregiver called to get a refill on Seroquel 200mg & 400mg and KlonoPin 0.5mg. He states there are no refill at the pharmacy.

## 2025-06-26 ENCOUNTER — OFFICE VISIT (OUTPATIENT)
Dept: PSYCHIATRY | Facility: CLINIC | Age: 35
End: 2025-06-26
Payer: MEDICARE

## 2025-06-26 VITALS
HEART RATE: 110 BPM | OXYGEN SATURATION: 97 % | WEIGHT: 168.4 LBS | DIASTOLIC BLOOD PRESSURE: 86 MMHG | BODY MASS INDEX: 28.75 KG/M2 | SYSTOLIC BLOOD PRESSURE: 136 MMHG | HEIGHT: 64 IN

## 2025-06-26 DIAGNOSIS — F63.81 INTERMITTENT EXPLOSIVE DISORDER IN ADULT: Primary | ICD-10-CM

## 2025-06-26 DIAGNOSIS — F42.2 MIXED OBSESSIONAL THOUGHTS AND ACTS: ICD-10-CM

## 2025-06-26 DIAGNOSIS — F79 INTELLECTUAL DISABILITY: ICD-10-CM

## 2025-06-26 PROCEDURE — 99214 OFFICE O/P EST MOD 30 MIN: CPT | Performed by: NURSE PRACTITIONER

## 2025-06-26 PROCEDURE — 1159F MED LIST DOCD IN RCRD: CPT | Performed by: NURSE PRACTITIONER

## 2025-06-26 PROCEDURE — 1160F RVW MEDS BY RX/DR IN RCRD: CPT | Performed by: NURSE PRACTITIONER

## 2025-06-26 RX ORDER — QUETIAPINE 400 MG/1
400 TABLET, FILM COATED, EXTENDED RELEASE ORAL DAILY
Qty: 30 TABLET | Refills: 3 | Status: SHIPPED | OUTPATIENT
Start: 2025-06-26

## 2025-06-26 RX ORDER — QUETIAPINE FUMARATE 400 MG/1
400 TABLET, FILM COATED ORAL NIGHTLY
Qty: 30 TABLET | Refills: 3 | Status: SHIPPED | OUTPATIENT
Start: 2025-06-26

## 2025-06-26 RX ORDER — CLONAZEPAM 0.5 MG/1
0.5 TABLET ORAL 2 TIMES DAILY
Qty: 60 TABLET | Refills: 3 | Status: SHIPPED | OUTPATIENT
Start: 2025-06-26

## 2025-06-26 RX ORDER — LAMOTRIGINE 100 MG/1
100 TABLET ORAL 2 TIMES DAILY
Qty: 60 TABLET | Refills: 0 | Status: SHIPPED | OUTPATIENT
Start: 2025-06-26

## 2025-06-26 RX ORDER — DULOXETIN HYDROCHLORIDE 60 MG/1
CAPSULE, DELAYED RELEASE ORAL
Qty: 60 CAPSULE | Refills: 3 | Status: SHIPPED | OUTPATIENT
Start: 2025-06-26

## 2025-06-26 NOTE — PROGRESS NOTES
Kymberly Torres is a 35 y.o. male is here today for medication management follow-up. Patient's PCP is [facility] located in [facility]    Chief Complaint:  Recheck on behaviors     History of Present Illness:     Presents with caretaker Enoch.  Enoch is the historian.    History of Present Illness  The patient is a 35-year-old male who presents for evaluation of obsessive-compulsive disorder, behaviors, and medication management.    The patient is a 35-year-old male who presents for evaluation of seizures. He is accompanied by his home caretaker Enoch.  Enoch is the historian as pt has limited conversation.      Interim History: The patient has been experiencing excessive daytime sleepiness after taking the seroquel.  His neurologist had suggested switching his seroquel daytime to XR version.   His sleep pattern is characterized by intermittent periods of sleepiness. His caretaker reports that he becomes agitated and angry when his questions are not answered to his satisfaction. He has not had any recent seizures or other medical issues since the last visit. His caretaker has observed tremors, which seem to be more pronounced when he is nervous. Tremors are infrequent.  Body mass index is 28.89 kg/m².  Weight gain 7 lbs since last visit.  Says that they have moved to a different home and patient is now in the bedroom across from the caretakers and he states this has really helped his overall behaviors.  He does not know why if patient just likes it better there he is more calm has not had any anger outbursts and has been cooperative especially when given a reward system such as a reward for good behavior.    Social History:  - Lives in an underground house with his caretaker and the caretaker's teenage daughters  - Sees his mother 2 to 3 times a year    Pertinent Negatives: The patient reports no recent seizures or other medical issues since the last visit.             The following portions  "of the patient's history were reviewed and updated as appropriate: allergies, current medications, past family history, past medical history, past social history, past surgical history and problem list.    Review of Systems   Constitutional:  Negative for activity change, appetite change and fatigue.   HENT: Negative.     Eyes:  Negative for visual disturbance.   Respiratory: Negative.     Cardiovascular: Negative.    Gastrointestinal:  Negative for nausea.   Endocrine: Negative.    Genitourinary: Negative.    Musculoskeletal:  Negative for arthralgias.   Skin: Negative.    Allergic/Immunologic: Negative.    Neurological:  Negative for dizziness, seizures and headaches.   Hematological: Negative.    Psychiatric/Behavioral:  Positive for behavioral problems. Negative for agitation, confusion, decreased concentration, dysphoric mood, hallucinations, self-injury, sleep disturbance and suicidal ideas. The patient is not nervous/anxious and is not hyperactive.    Objective       Physical Exam  Vitals reviewed.   Constitutional:       Appearance: He is normal weight.   Musculoskeletal:      Cervical back: Normal range of motion and neck supple.   Neurological:      Mental Status: He is alert.   Psychiatric:         Attention and Perception: He is inattentive.         Mood and Affect: Mood is not anxious. Affect is blunt. Affect is not angry.         Behavior: Behavior is not agitated, aggressive or combative.         Cognition and Memory: Cognition is impaired.         Judgment: Judgment is impulsive.      Comments: Today pt was making some eye contact.  Sitting with arms crossed on couch.  Would not talk to me.          Blood pressure 136/86, pulse 110, height 162.6 cm (64.02\"), weight 76.4 kg (168 lb 6.4 oz), SpO2 97%.    Medication List:   Current Outpatient Medications   Medication Sig Dispense Refill    clonazePAM (KlonoPIN) 0.5 MG tablet Take 1 tablet by mouth 2 (Two) Times a Day. 60 tablet 3    DULoxetine " (CYMBALTA) 60 MG capsule 2 po daily 60 capsule 3    lamoTRIgine (LaMICtal) 100 MG tablet Take 1 tablet by mouth 2 (Two) Times a Day. 60 tablet 0    QUEtiapine (SEROquel) 400 MG tablet Take 1 tablet by mouth Every Night. 30 tablet 3    benzoyl peroxide 5 % external wash Apply 148 doses topically to the appropriate area as directed 2 (two) times a day.      carbamide peroxide (DEBROX) 6.5 % otic solution Administer 5 drops into ear(s) as directed by provider.      cholecalciferol (VITAMIN D3) 10 MCG (400 UNIT) tablet Take 400 Units by mouth.      docusate sodium 250 MG capsule Take  by mouth.      gabapentin (NEURONTIN) 300 MG capsule Take 1 capsule by mouth 4 (Four) Times a Day. One in the AM and 2 in the PM      levothyroxine (SYNTHROID, LEVOTHROID) 150 MCG tablet Take 150 mcg by mouth Daily.      Linzess 145 MCG capsule capsule Take 145 mcg by mouth Daily.      QUEtiapine XR (SEROquel XR) 400 MG 24 hr tablet Take 1 tablet by mouth Daily. 30 tablet 3    tretinoin (RETIN-A) 0.1 % cream APPLY PEA-SIZED AMOUNT TO THE AFFECTED AREA AT BEDTIME MIX WITH CLINDAMYCIN IN MORNINGS       No current facility-administered medications for this visit.     Reviewed copied data and there are no changes    Mental Status Exam:   Hygiene:   fair  Cooperation:  Cooperative  Eye Contact:  Poor  Psychomotor Behavior:  Restless  Affect:  Blunted  Hopelessness: Denies  Speech:  difficult to understand  Thought Process:  Unable to demonstrate  Thought Content:  Unable to demonstrate  Suicidal:  None  Homicidal:  None  Hallucinations:  Not demonstrated today  Delusion:  Unable to demonstrate  Memory:  Unable to evaluate  Orientation:  Unable to evaluate  Reliability:  poor  Insight:  Poor  Judgement:  Poor  Impulse Control:  Poor  Physical/Medical Issues:  Yes hypothyroidism    Assessment & Plan   Problems Addressed this Visit          Neuro    Intellectual disability     Other Visit Diagnoses         Intermittent explosive disorder in adult     -  Primary    Relevant Medications    QUEtiapine (SEROquel) 400 MG tablet    DULoxetine (CYMBALTA) 60 MG capsule    clonazePAM (KlonoPIN) 0.5 MG tablet    lamoTRIgine (LaMICtal) 100 MG tablet    QUEtiapine XR (SEROquel XR) 400 MG 24 hr tablet      Mixed obsessional thoughts and acts        Relevant Medications    DULoxetine (CYMBALTA) 60 MG capsule          Diagnoses         Codes Comments      Intermittent explosive disorder in adult    -  Primary ICD-10-CM: F63.81  ICD-9-CM: 312.34       Mixed obsessional thoughts and acts     ICD-10-CM: F42.2  ICD-9-CM: 300.3       Intellectual disability     ICD-10-CM: F79  ICD-9-CM: 319             Functionality: pt having significant impairment in important areas of daily functioning.  Prognosis: Guarded dependent on medication/follow up and treatment plan compliance.  Champ reviewed.   Caregiver states pt had labwork at his PCPs recently and was told everything was normal.  He is going to have them fax me results at their next appointment     Assessment & Plan  Problems:  - Seizures  - Medication Management    Content of Therapy:  During the session, the caretaker discussed the patient's living arrangements, medication adherence, and behavioral issues. The caretaker mentioned moving the patient to a different room and noted the patient's contentment with the new setup. The caretaker also shared concerns about the patient's tendency to ask repetitive questions and occasional agitation. The discussion included the patient's medication regimen and the need for adjustments to manage sleep and daytime drowsiness.    Clinical Impression:  The patient appears to be stable with no recent seizures reported. There have been no significant behavioral outbursts, although the caretaker mentioned an incident with another staff member. The patient has gained 7 pounds, but his BMI remains within a normal range. The caretaker reported that the patient experiences drowsiness, likely due to  his current medication regimen, particularly Seroquel The patient's overall response to treatment is satisfactory, with no major changes in symptoms.    Therapeutic Intervention:  The therapeutic intervention focused on medication management. The Seroquel regimen was adjusted to 400 mg XR in the morning and 400 mg quick release at night to address sleep issues and daytime drowsiness. The caretaker was advised to contact the neurologist to take over the prescription for Lamictal.  Patient is taking the Lamictal for seizure control.  I am going to provide him a prescription for this month as I do not want him to be without his medication    Plan:  - Adjust Seroquel regimen to 400 mg XR in the morning and 400 mg quick release at night.  - Issue a prescription for a month's supply of Lamictal.  - Contact neurologist to take over the prescription for Lamictal.  - Conduct a mouth swab for drug testing.  - Obtain a copy of the patient's labs from the primary care provider.    Follow-up:  - Schedule a follow-up appointment in 4 months.  - Obtain and bring a copy of the patient's labs to the next appointment.    Notes & Risk Factors:  - No recent seizures reported.  - Occasional agitation and repetitive questioning noted.  - Caretaker reported an incident with another staff member but no physical altercation.  - Patient experiences drowsiness, likely due to medication.        Refills of all been submitted.RTC 4 months.  Sooner if needed.   Caregiver will notify me should any problems develop and I can see him sooner.  Patient or patient representative verbalized consent for the use of Ambient Listening during the visit with  TAHIR Galloway for chart documentation. 6/26/2025  13:01 EST                This document has been electronically signed by TAHIR Galloway on   June 26, 2025 11:11 EDT.

## 2025-07-07 RX ORDER — LEVOCETIRIZINE DIHYDROCHLORIDE 5 MG/1
5 TABLET, FILM COATED ORAL DAILY
COMMUNITY
Start: 2025-05-07

## 2025-07-07 NOTE — PROGRESS NOTES
Pts uds was positive for meth.  Called his caregiver karyn and told him.  Inquired as to patient taking any metformin or any type of allergy medicine.  He says he does not think he is taking allergy medicine patient is not actually with him today.  He stays at his sisters for 3 days a week and then at another caregivers he denies that anyone has any methamphetamine problem.  He is on metformin himself and he denies that any medication has been missing such as the patient had gotten into it.  I then called patient's mother and told her about the positive methamphetamine.  I told her I did not believe this was an accurate result.  Patient looked good the day of his appointment.  He was calm and did not act agitated.  He was clean he seems well taking care of.  Patient's other narcotics that he does take did show in his system I then told her what could be a false positive for methamphetamine on a urine drug screen which could include allergy medication and she states that he does take those all.  I have been called patient's caregiver Brendan back and he did confirm that he does take Xyzal.  Patient's mom seemed okay after our conversation and I told her I will call her with his next drug screen result and we will try to get that as a urine drug screen as it is a little bit more accurate we will see it depends upon how agitated patient is that they are how cooperative he is

## 2025-07-24 ENCOUNTER — TELEPHONE (OUTPATIENT)
Dept: FAMILY MEDICINE CLINIC | Facility: CLINIC | Age: 35
End: 2025-07-24
Payer: MEDICARE

## 2025-07-24 NOTE — TELEPHONE ENCOUNTER
Pharmacy called asking about the pt's Seroquel. I clarified the pt's script with Savita and gave the order to the pharmacy.